# Patient Record
Sex: FEMALE | Race: WHITE | NOT HISPANIC OR LATINO | Employment: FULL TIME | URBAN - METROPOLITAN AREA
[De-identification: names, ages, dates, MRNs, and addresses within clinical notes are randomized per-mention and may not be internally consistent; named-entity substitution may affect disease eponyms.]

---

## 2017-01-05 ENCOUNTER — LAB REQUISITION (OUTPATIENT)
Dept: LAB | Facility: HOSPITAL | Age: 47
End: 2017-01-05
Payer: COMMERCIAL

## 2017-01-05 DIAGNOSIS — D50.8 OTHER IRON DEFICIENCY ANEMIAS: ICD-10-CM

## 2017-01-05 DIAGNOSIS — E55.9 VITAMIN D DEFICIENCY: ICD-10-CM

## 2017-01-05 DIAGNOSIS — Z17.1 ESTROGEN RECEPTOR NEGATIVE TUMOR STATUS: ICD-10-CM

## 2017-01-05 DIAGNOSIS — C50.511 MALIGNANT NEOPLASM OF LOWER-OUTER QUADRANT OF RIGHT FEMALE BREAST (HCC): ICD-10-CM

## 2017-01-05 LAB
25(OH)D3 SERPL-MCNC: 23.6 NG/ML (ref 30–100)
HCG SERPL QL: NEGATIVE

## 2017-01-05 PROCEDURE — 84703 CHORIONIC GONADOTROPIN ASSAY: CPT | Performed by: NURSE PRACTITIONER

## 2017-01-05 PROCEDURE — 82306 VITAMIN D 25 HYDROXY: CPT | Performed by: NURSE PRACTITIONER

## 2017-01-25 ENCOUNTER — LAB REQUISITION (OUTPATIENT)
Dept: LAB | Facility: HOSPITAL | Age: 47
End: 2017-01-25
Payer: COMMERCIAL

## 2017-01-25 DIAGNOSIS — C50.511 MALIGNANT NEOPLASM OF LOWER-OUTER QUADRANT OF RIGHT FEMALE BREAST (HCC): ICD-10-CM

## 2017-01-25 LAB — CANCER AG125 SERPL-ACNC: 11.7 U/ML (ref 0–30)

## 2017-01-25 PROCEDURE — 86304 IMMUNOASSAY TUMOR CA 125: CPT | Performed by: NURSE PRACTITIONER

## 2017-02-02 ENCOUNTER — LAB REQUISITION (OUTPATIENT)
Dept: LAB | Facility: HOSPITAL | Age: 47
End: 2017-02-02
Payer: COMMERCIAL

## 2017-02-02 DIAGNOSIS — D50.8 OTHER IRON DEFICIENCY ANEMIAS: ICD-10-CM

## 2017-02-02 DIAGNOSIS — C50.511 MALIGNANT NEOPLASM OF LOWER-OUTER QUADRANT OF RIGHT FEMALE BREAST (HCC): ICD-10-CM

## 2017-02-02 DIAGNOSIS — M79.10 MYALGIA: ICD-10-CM

## 2017-02-02 DIAGNOSIS — E06.3 AUTOIMMUNE THYROIDITIS: ICD-10-CM

## 2017-02-02 LAB — HCG SERPL QL: NEGATIVE

## 2017-02-02 PROCEDURE — 84703 CHORIONIC GONADOTROPIN ASSAY: CPT | Performed by: INTERNAL MEDICINE

## 2017-02-22 ENCOUNTER — LAB REQUISITION (OUTPATIENT)
Dept: LAB | Facility: HOSPITAL | Age: 47
End: 2017-02-22
Payer: COMMERCIAL

## 2017-02-22 DIAGNOSIS — C50.511 MALIGNANT NEOPLASM OF LOWER-OUTER QUADRANT OF RIGHT FEMALE BREAST (HCC): ICD-10-CM

## 2017-02-22 DIAGNOSIS — M79.10 MYALGIA: ICD-10-CM

## 2017-02-22 DIAGNOSIS — E06.3 AUTOIMMUNE THYROIDITIS: ICD-10-CM

## 2017-02-22 DIAGNOSIS — Z17.1 ESTROGEN RECEPTOR NEGATIVE TUMOR STATUS: ICD-10-CM

## 2017-02-22 DIAGNOSIS — D50.8 OTHER IRON DEFICIENCY ANEMIAS: ICD-10-CM

## 2017-02-22 LAB — CANCER AG125 SERPL-ACNC: 10.4 U/ML (ref 0–30)

## 2017-02-22 PROCEDURE — 86304 IMMUNOASSAY TUMOR CA 125: CPT | Performed by: NURSE PRACTITIONER

## 2017-03-22 ENCOUNTER — LAB REQUISITION (OUTPATIENT)
Dept: LAB | Facility: HOSPITAL | Age: 47
End: 2017-03-22
Payer: COMMERCIAL

## 2017-03-22 DIAGNOSIS — Z17.0 ESTROGEN RECEPTOR POSITIVE TUMOR STATUS: ICD-10-CM

## 2017-03-22 DIAGNOSIS — D50.8 OTHER IRON DEFICIENCY ANEMIAS: ICD-10-CM

## 2017-03-22 DIAGNOSIS — C50.511 MALIGNANT NEOPLASM OF LOWER-OUTER QUADRANT OF RIGHT FEMALE BREAST (HCC): ICD-10-CM

## 2017-03-22 LAB — CANCER AG125 SERPL-ACNC: 7.4 U/ML (ref 0–30)

## 2017-03-22 PROCEDURE — 86304 IMMUNOASSAY TUMOR CA 125: CPT | Performed by: NURSE PRACTITIONER

## 2017-03-29 ENCOUNTER — GENERIC CONVERSION - ENCOUNTER (OUTPATIENT)
Dept: OTHER | Facility: OTHER | Age: 47
End: 2017-03-29

## 2017-03-31 ENCOUNTER — TRANSCRIBE ORDERS (OUTPATIENT)
Dept: ADMINISTRATIVE | Facility: HOSPITAL | Age: 47
End: 2017-03-31

## 2017-03-31 ENCOUNTER — ALLSCRIPTS OFFICE VISIT (OUTPATIENT)
Dept: OTHER | Facility: OTHER | Age: 47
End: 2017-03-31

## 2017-03-31 DIAGNOSIS — C50.511 MALIGNANT NEOPLASM OF LOWER-OUTER QUADRANT OF RIGHT FEMALE BREAST (HCC): Primary | ICD-10-CM

## 2017-04-03 ENCOUNTER — HOSPITAL ENCOUNTER (OUTPATIENT)
Dept: RADIOLOGY | Age: 47
Discharge: HOME | End: 2017-04-03
Payer: COMMERCIAL

## 2017-04-03 DIAGNOSIS — C50.511: ICD-10-CM

## 2017-04-03 LAB — GLUCOSE SERPL-MCNC: 107 MG/DL (ref 65–140)

## 2017-04-03 PROCEDURE — A9552 F18 FDG: HCPCS

## 2017-04-03 PROCEDURE — 78815 PET IMAGE W/CT SKULL-THIGH: CPT

## 2017-04-03 PROCEDURE — 82948 REAGENT STRIP/BLOOD GLUCOSE: CPT

## 2017-04-22 ENCOUNTER — HOSPITAL ENCOUNTER (OUTPATIENT)
Dept: RADIOLOGY | Facility: HOSPITAL | Age: 47
Discharge: HOME | End: 2017-04-22
Attending: SURGERY
Payer: COMMERCIAL

## 2017-04-22 DIAGNOSIS — C50.511 MALIGNANT NEOPLASM OF LOWER-OUTER QUADRANT OF RIGHT FEMALE BREAST (HCC): ICD-10-CM

## 2017-04-22 PROCEDURE — 77059 HB MRI W/O FOL W/CONT, BREAST,: CPT

## 2017-04-22 PROCEDURE — C8908 MRI W/O FOL W/CONT, BREAST,: HCPCS

## 2017-04-24 ENCOUNTER — ANESTHESIA EVENT (OUTPATIENT)
Dept: GASTROENTEROLOGY | Facility: HOSPITAL | Age: 47
End: 2017-04-24
Payer: COMMERCIAL

## 2017-04-25 ENCOUNTER — HOSPITAL ENCOUNTER (OUTPATIENT)
Facility: HOSPITAL | Age: 47
Setting detail: OUTPATIENT SURGERY
Discharge: HOME | End: 2017-04-25
Attending: SURGERY | Admitting: SURGERY
Payer: COMMERCIAL

## 2017-04-25 ENCOUNTER — ANESTHESIA (OUTPATIENT)
Dept: GASTROENTEROLOGY | Facility: HOSPITAL | Age: 47
End: 2017-04-25
Payer: COMMERCIAL

## 2017-04-25 ENCOUNTER — GENERIC CONVERSION - ENCOUNTER (OUTPATIENT)
Dept: PERIOP | Facility: HOSPITAL | Age: 47
End: 2017-04-25

## 2017-04-25 ENCOUNTER — GENERIC CONVERSION - ENCOUNTER (OUTPATIENT)
Dept: OTHER | Facility: OTHER | Age: 47
End: 2017-04-25

## 2017-04-25 VITALS
TEMPERATURE: 98.1 F | SYSTOLIC BLOOD PRESSURE: 120 MMHG | DIASTOLIC BLOOD PRESSURE: 66 MMHG | RESPIRATION RATE: 14 BRPM | HEIGHT: 66 IN | OXYGEN SATURATION: 100 % | HEART RATE: 80 BPM | WEIGHT: 175 LBS | BODY MASS INDEX: 28.12 KG/M2

## 2017-04-25 DIAGNOSIS — Z12.11 ENCOUNTER FOR SCREENING FOR MALIGNANT NEOPLASM OF COLON: ICD-10-CM

## 2017-04-25 DIAGNOSIS — K21.9 GASTRO-ESOPHAGEAL REFLUX DISEASE WITHOUT ESOPHAGITIS: ICD-10-CM

## 2017-04-25 LAB — EXT PREGNANCY TEST URINE: NEGATIVE

## 2017-04-25 PROCEDURE — 88305 TISSUE EXAM BY PATHOLOGIST: CPT | Performed by: SURGERY

## 2017-04-25 PROCEDURE — 81025 URINE PREGNANCY TEST: CPT | Performed by: ANESTHESIOLOGY

## 2017-04-25 RX ORDER — PROPOFOL 10 MG/ML
INJECTION, EMULSION INTRAVENOUS CONTINUOUS PRN
Status: DISCONTINUED | OUTPATIENT
Start: 2017-04-25 | End: 2017-04-25 | Stop reason: SURG

## 2017-04-25 RX ORDER — PROPOFOL 10 MG/ML
INJECTION, EMULSION INTRAVENOUS AS NEEDED
Status: DISCONTINUED | OUTPATIENT
Start: 2017-04-25 | End: 2017-04-25 | Stop reason: SURG

## 2017-04-25 RX ORDER — METOPROLOL SUCCINATE 25 MG/1
25 TABLET, EXTENDED RELEASE ORAL EVERY EVENING
COMMUNITY

## 2017-04-25 RX ORDER — SODIUM CHLORIDE 9 MG/ML
125 INJECTION, SOLUTION INTRAVENOUS CONTINUOUS
Status: DISCONTINUED | OUTPATIENT
Start: 2017-04-25 | End: 2017-04-25 | Stop reason: HOSPADM

## 2017-04-25 RX ADMIN — PROPOFOL 50 MG: 10 INJECTION, EMULSION INTRAVENOUS at 10:05

## 2017-04-25 RX ADMIN — SODIUM CHLORIDE: 0.9 INJECTION, SOLUTION INTRAVENOUS at 09:53

## 2017-04-25 RX ADMIN — PROPOFOL 50 MG: 10 INJECTION, EMULSION INTRAVENOUS at 10:12

## 2017-04-25 RX ADMIN — PROPOFOL 120 MCG/KG/MIN: 10 INJECTION, EMULSION INTRAVENOUS at 09:57

## 2017-04-25 RX ADMIN — PROPOFOL 120 MG: 10 INJECTION, EMULSION INTRAVENOUS at 09:57

## 2017-04-25 RX ADMIN — SODIUM CHLORIDE 125 ML/HR: 0.9 INJECTION, SOLUTION INTRAVENOUS at 09:23

## 2017-04-27 ENCOUNTER — ALLSCRIPTS OFFICE VISIT (OUTPATIENT)
Dept: OTHER | Facility: OTHER | Age: 47
End: 2017-04-27

## 2017-04-28 ENCOUNTER — TRANSCRIBE ORDERS (OUTPATIENT)
Dept: ADMINISTRATIVE | Facility: HOSPITAL | Age: 47
End: 2017-04-28

## 2017-04-28 ENCOUNTER — LAB REQUISITION (OUTPATIENT)
Dept: LAB | Facility: HOSPITAL | Age: 47
End: 2017-04-28
Payer: COMMERCIAL

## 2017-04-28 DIAGNOSIS — C50.511 MALIGNANT NEOPLASM OF LOWER-OUTER QUADRANT OF RIGHT FEMALE BREAST (HCC): ICD-10-CM

## 2017-04-28 DIAGNOSIS — E55.9 VITAMIN D DEFICIENCY: ICD-10-CM

## 2017-04-28 DIAGNOSIS — C50.511 MALIGNANT NEOPLASM OF LOWER-OUTER QUADRANT OF RIGHT FEMALE BREAST (HCC): Primary | ICD-10-CM

## 2017-04-28 DIAGNOSIS — Z17.1 ESTROGEN RECEPTOR NEGATIVE TUMOR STATUS: ICD-10-CM

## 2017-04-28 LAB
25(OH)D3 SERPL-MCNC: 32.9 NG/ML (ref 30–100)
CANCER AG125 SERPL-ACNC: 7.2 U/ML (ref 0–30)

## 2017-04-28 PROCEDURE — 86304 IMMUNOASSAY TUMOR CA 125: CPT | Performed by: NURSE PRACTITIONER

## 2017-04-28 PROCEDURE — 82306 VITAMIN D 25 HYDROXY: CPT | Performed by: NURSE PRACTITIONER

## 2017-05-01 ENCOUNTER — TRANSCRIBE ORDERS (OUTPATIENT)
Dept: ADMINISTRATIVE | Facility: HOSPITAL | Age: 47
End: 2017-05-01

## 2017-05-01 DIAGNOSIS — Z92.21 S/P CHEMOTHERAPY, TIME SINCE 4-12 WEEKS: Primary | ICD-10-CM

## 2017-05-04 ENCOUNTER — GENERIC CONVERSION - ENCOUNTER (OUTPATIENT)
Dept: OTHER | Facility: OTHER | Age: 47
End: 2017-05-04

## 2017-05-16 ENCOUNTER — HOSPITAL ENCOUNTER (OUTPATIENT)
Dept: NON INVASIVE DIAGNOSTICS | Facility: HOSPITAL | Age: 47
Discharge: HOME | End: 2017-05-16
Attending: INTERNAL MEDICINE
Payer: COMMERCIAL

## 2017-05-16 ENCOUNTER — ANESTHESIA EVENT (OUTPATIENT)
Dept: PERIOP | Facility: HOSPITAL | Age: 47
End: 2017-05-16
Payer: COMMERCIAL

## 2017-05-16 DIAGNOSIS — Z92.21 S/P CHEMOTHERAPY, TIME SINCE 4-12 WEEKS: ICD-10-CM

## 2017-05-16 PROCEDURE — 93306 TTE W/DOPPLER COMPLETE: CPT

## 2017-05-24 ENCOUNTER — HOSPITAL ENCOUNTER (OUTPATIENT)
Dept: MAMMOGRAPHY | Facility: HOSPITAL | Age: 47
Discharge: HOME | End: 2017-05-24
Attending: SURGERY
Payer: COMMERCIAL

## 2017-05-24 ENCOUNTER — HOSPITAL ENCOUNTER (OUTPATIENT)
Dept: NUCLEAR MEDICINE | Facility: HOSPITAL | Age: 47
Discharge: HOME | End: 2017-05-24
Attending: SURGERY
Payer: COMMERCIAL

## 2017-05-24 ENCOUNTER — CONVERSION ENCOUNTER (OUTPATIENT)
Dept: MAMMOGRAPHY | Facility: HOSPITAL | Age: 47
End: 2017-05-24

## 2017-05-24 ENCOUNTER — HOSPITAL ENCOUNTER (OUTPATIENT)
Facility: HOSPITAL | Age: 47
Setting detail: OUTPATIENT SURGERY
Discharge: HOME | End: 2017-05-25
Attending: SURGERY | Admitting: SURGERY
Payer: COMMERCIAL

## 2017-05-24 ENCOUNTER — HOSPITAL ENCOUNTER (OUTPATIENT)
Dept: MAMMOGRAPHY | Facility: HOSPITAL | Age: 47
Setting detail: OUTPATIENT SURGERY
Discharge: HOME | End: 2017-05-24
Payer: COMMERCIAL

## 2017-05-24 ENCOUNTER — ANESTHESIA (OUTPATIENT)
Dept: PERIOP | Facility: HOSPITAL | Age: 47
End: 2017-05-24
Payer: COMMERCIAL

## 2017-05-24 DIAGNOSIS — C50.511 MALIGNANT NEOPLASM OF LOWER-OUTER QUADRANT OF RIGHT FEMALE BREAST (HCC): ICD-10-CM

## 2017-05-24 DIAGNOSIS — C50.911 MALIGNANT NEOPLASM OF RIGHT FEMALE BREAST (HCC): ICD-10-CM

## 2017-05-24 LAB — EXT PREGNANCY TEST URINE: NEGATIVE

## 2017-05-24 PROCEDURE — A9270 NON-COVERED ITEM OR SERVICE: HCPCS | Performed by: PHYSICIAN ASSISTANT

## 2017-05-24 PROCEDURE — 88307 TISSUE EXAM BY PATHOLOGIST: CPT | Performed by: SURGERY

## 2017-05-24 PROCEDURE — 81025 URINE PREGNANCY TEST: CPT | Performed by: SURGERY

## 2017-05-24 PROCEDURE — 88342 IMHCHEM/IMCYTCHM 1ST ANTB: CPT | Performed by: SURGERY

## 2017-05-24 PROCEDURE — 88341 IMHCHEM/IMCYTCHM EA ADD ANTB: CPT | Performed by: SURGERY

## 2017-05-24 PROCEDURE — 88361 TUMOR IMMUNOHISTOCHEM/COMPUT: CPT | Performed by: SURGERY

## 2017-05-24 PROCEDURE — A9541 TC99M SULFUR COLLOID: HCPCS

## 2017-05-24 PROCEDURE — 19281 PERQ DEVICE BREAST 1ST IMAG: CPT

## 2017-05-24 PROCEDURE — 19282 PERQ DEVICE BREAST EA IMAG: CPT

## 2017-05-24 PROCEDURE — 38792 RA TRACER ID OF SENTINL NODE: CPT

## 2017-05-24 RX ORDER — ONDANSETRON 2 MG/ML
4 INJECTION INTRAMUSCULAR; INTRAVENOUS EVERY 4 HOURS PRN
Status: DISCONTINUED | OUTPATIENT
Start: 2017-05-24 | End: 2017-05-25 | Stop reason: HOSPADM

## 2017-05-24 RX ORDER — SODIUM CHLORIDE, SODIUM LACTATE, POTASSIUM CHLORIDE, CALCIUM CHLORIDE 600; 310; 30; 20 MG/100ML; MG/100ML; MG/100ML; MG/100ML
75 INJECTION, SOLUTION INTRAVENOUS CONTINUOUS
Status: DISCONTINUED | OUTPATIENT
Start: 2017-05-24 | End: 2017-05-25 | Stop reason: HOSPADM

## 2017-05-24 RX ORDER — ONDANSETRON 2 MG/ML
INJECTION INTRAMUSCULAR; INTRAVENOUS AS NEEDED
Status: DISCONTINUED | OUTPATIENT
Start: 2017-05-24 | End: 2017-05-24 | Stop reason: SURG

## 2017-05-24 RX ORDER — METHYLENE BLUE 10 MG/ML
INJECTION INTRAVENOUS AS NEEDED
Status: DISCONTINUED | OUTPATIENT
Start: 2017-05-24 | End: 2017-05-24 | Stop reason: HOSPADM

## 2017-05-24 RX ORDER — LIDOCAINE WITH 8.4% SOD BICARB 0.9%(10ML)
10 SYRINGE (ML) INJECTION ONCE
Status: DISCONTINUED | OUTPATIENT
Start: 2017-05-24 | End: 2017-05-25 | Stop reason: HOSPADM

## 2017-05-24 RX ORDER — HEPARIN SODIUM 5000 [USP'U]/ML
5000 INJECTION, SOLUTION INTRAVENOUS; SUBCUTANEOUS EVERY 8 HOURS SCHEDULED
Status: DISCONTINUED | OUTPATIENT
Start: 2017-05-24 | End: 2017-05-25 | Stop reason: HOSPADM

## 2017-05-24 RX ORDER — MIDAZOLAM HYDROCHLORIDE 1 MG/ML
INJECTION INTRAMUSCULAR; INTRAVENOUS AS NEEDED
Status: DISCONTINUED | OUTPATIENT
Start: 2017-05-24 | End: 2017-05-24 | Stop reason: SURG

## 2017-05-24 RX ORDER — LIDOCAINE HYDROCHLORIDE 10 MG/ML
INJECTION, SOLUTION INFILTRATION; PERINEURAL AS NEEDED
Status: DISCONTINUED | OUTPATIENT
Start: 2017-05-24 | End: 2017-05-24 | Stop reason: SURG

## 2017-05-24 RX ORDER — SODIUM CHLORIDE, SODIUM LACTATE, POTASSIUM CHLORIDE, CALCIUM CHLORIDE 600; 310; 30; 20 MG/100ML; MG/100ML; MG/100ML; MG/100ML
80 INJECTION, SOLUTION INTRAVENOUS CONTINUOUS
Status: DISCONTINUED | OUTPATIENT
Start: 2017-05-24 | End: 2017-05-25 | Stop reason: SDUPTHER

## 2017-05-24 RX ORDER — PROPOFOL 10 MG/ML
INJECTION, EMULSION INTRAVENOUS AS NEEDED
Status: DISCONTINUED | OUTPATIENT
Start: 2017-05-24 | End: 2017-05-24 | Stop reason: SURG

## 2017-05-24 RX ORDER — HYDROCODONE BITARTRATE AND ACETAMINOPHEN 5; 325 MG/1; MG/1
1 TABLET ORAL EVERY 4 HOURS PRN
Qty: 30 TABLET | Refills: 0 | Status: ON HOLD | OUTPATIENT
Start: 2017-05-24 | End: 2018-03-01 | Stop reason: ALTCHOICE

## 2017-05-24 RX ORDER — GLYCOPYRROLATE 0.2 MG/ML
INJECTION INTRAMUSCULAR; INTRAVENOUS AS NEEDED
Status: DISCONTINUED | OUTPATIENT
Start: 2017-05-24 | End: 2017-05-24 | Stop reason: SURG

## 2017-05-24 RX ORDER — HYDROCODONE BITARTRATE AND ACETAMINOPHEN 5; 325 MG/1; MG/1
1 TABLET ORAL EVERY 4 HOURS PRN
Status: DISCONTINUED | OUTPATIENT
Start: 2017-05-24 | End: 2017-05-25 | Stop reason: HOSPADM

## 2017-05-24 RX ORDER — FENTANYL CITRATE/PF 50 MCG/ML
50 SYRINGE (ML) INJECTION
Status: DISCONTINUED | OUTPATIENT
Start: 2017-05-24 | End: 2017-05-24 | Stop reason: HOSPADM

## 2017-05-24 RX ORDER — CLINDAMYCIN PHOSPHATE 900 MG/50ML
900 INJECTION INTRAVENOUS
Status: DISCONTINUED | OUTPATIENT
Start: 2017-05-24 | End: 2017-05-24 | Stop reason: HOSPADM

## 2017-05-24 RX ORDER — 0.9 % SODIUM CHLORIDE 0.9 %
VIAL (ML) INJECTION AS NEEDED
Status: DISCONTINUED | OUTPATIENT
Start: 2017-05-24 | End: 2017-05-24 | Stop reason: HOSPADM

## 2017-05-24 RX ORDER — ONDANSETRON 2 MG/ML
4 INJECTION INTRAMUSCULAR; INTRAVENOUS ONCE
Status: DISCONTINUED | OUTPATIENT
Start: 2017-05-24 | End: 2017-05-24 | Stop reason: HOSPADM

## 2017-05-24 RX ORDER — ACETAMINOPHEN 325 MG/1
650 TABLET ORAL EVERY 6 HOURS PRN
Status: DISCONTINUED | OUTPATIENT
Start: 2017-05-24 | End: 2017-05-25 | Stop reason: HOSPADM

## 2017-05-24 RX ORDER — ROCURONIUM BROMIDE 10 MG/ML
INJECTION, SOLUTION INTRAVENOUS AS NEEDED
Status: DISCONTINUED | OUTPATIENT
Start: 2017-05-24 | End: 2017-05-24 | Stop reason: SURG

## 2017-05-24 RX ORDER — SODIUM CHLORIDE 9 MG/ML
125 INJECTION, SOLUTION INTRAVENOUS CONTINUOUS
Status: DISCONTINUED | OUTPATIENT
Start: 2017-05-24 | End: 2017-05-25 | Stop reason: HOSPADM

## 2017-05-24 RX ORDER — SUCCINYLCHOLINE CHLORIDE 20 MG/ML
INJECTION INTRAMUSCULAR; INTRAVENOUS AS NEEDED
Status: DISCONTINUED | OUTPATIENT
Start: 2017-05-24 | End: 2017-05-24 | Stop reason: SURG

## 2017-05-24 RX ORDER — MORPHINE SULFATE 2 MG/ML
2 INJECTION, SOLUTION INTRAMUSCULAR; INTRAVENOUS EVERY 2 HOUR PRN
Status: DISCONTINUED | OUTPATIENT
Start: 2017-05-24 | End: 2017-05-25 | Stop reason: HOSPADM

## 2017-05-24 RX ORDER — FENTANYL CITRATE 50 UG/ML
INJECTION, SOLUTION INTRAMUSCULAR; INTRAVENOUS AS NEEDED
Status: DISCONTINUED | OUTPATIENT
Start: 2017-05-24 | End: 2017-05-24 | Stop reason: SURG

## 2017-05-24 RX ADMIN — PROPOFOL 100 MG: 10 INJECTION, EMULSION INTRAVENOUS at 13:10

## 2017-05-24 RX ADMIN — ONDANSETRON HYDROCHLORIDE 4 MG: 2 INJECTION, SOLUTION INTRAVENOUS at 13:59

## 2017-05-24 RX ADMIN — SUCCINYLCHOLINE CHLORIDE 100 MG: 20 INJECTION, SOLUTION INTRAMUSCULAR; INTRAVENOUS at 13:05

## 2017-05-24 RX ADMIN — PROPOFOL 200 MG: 10 INJECTION, EMULSION INTRAVENOUS at 13:05

## 2017-05-24 RX ADMIN — SODIUM CHLORIDE 125 ML/HR: 0.9 INJECTION, SOLUTION INTRAVENOUS at 09:30

## 2017-05-24 RX ADMIN — FENTANYL CITRATE 50 MCG: 50 INJECTION, SOLUTION INTRAMUSCULAR; INTRAVENOUS at 13:44

## 2017-05-24 RX ADMIN — DEXAMETHASONE SODIUM PHOSPHATE 10 MG: 10 INJECTION INTRAMUSCULAR; INTRAVENOUS at 13:59

## 2017-05-24 RX ADMIN — HYDROCODONE BITARTRATE AND ACETAMINOPHEN 1 TABLET: 5; 325 TABLET ORAL at 16:33

## 2017-05-24 RX ADMIN — NEOSTIGMINE METHYLSULFATE 4 MG: 1 INJECTION, SOLUTION INTRAMUSCULAR; INTRAVENOUS; SUBCUTANEOUS at 14:44

## 2017-05-24 RX ADMIN — HYDROCODONE BITARTRATE AND ACETAMINOPHEN 1 TABLET: 5; 325 TABLET ORAL at 21:04

## 2017-05-24 RX ADMIN — FENTANYL CITRATE 50 MCG: 50 INJECTION, SOLUTION INTRAMUSCULAR; INTRAVENOUS at 13:50

## 2017-05-24 RX ADMIN — GLYCOPYRROLATE 0.8 MG: 0.2 INJECTION, SOLUTION INTRAMUSCULAR; INTRAVENOUS at 14:44

## 2017-05-24 RX ADMIN — MIDAZOLAM HYDROCHLORIDE 2 MG: 1 INJECTION, SOLUTION INTRAMUSCULAR; INTRAVENOUS at 13:02

## 2017-05-24 RX ADMIN — FENTANYL CITRATE 100 MCG: 50 INJECTION, SOLUTION INTRAMUSCULAR; INTRAVENOUS at 13:05

## 2017-05-24 RX ADMIN — ROCURONIUM BROMIDE 50 MG: 10 INJECTION, SOLUTION INTRAVENOUS at 13:11

## 2017-05-24 RX ADMIN — LIDOCAINE HYDROCHLORIDE 100 MG: 10 INJECTION, SOLUTION INFILTRATION; PERINEURAL at 13:05

## 2017-05-24 RX ADMIN — SODIUM CHLORIDE: 0.9 INJECTION, SOLUTION INTRAVENOUS at 15:05

## 2017-05-24 RX ADMIN — CLINDAMYCIN PHOSPHATE 900 MG: 18 INJECTION, SOLUTION INTRAMUSCULAR; INTRAVENOUS at 13:26

## 2017-05-25 VITALS
TEMPERATURE: 97.8 F | OXYGEN SATURATION: 97 % | HEIGHT: 66 IN | WEIGHT: 181.6 LBS | DIASTOLIC BLOOD PRESSURE: 69 MMHG | RESPIRATION RATE: 20 BRPM | BODY MASS INDEX: 29.18 KG/M2 | HEART RATE: 111 BPM | SYSTOLIC BLOOD PRESSURE: 124 MMHG

## 2017-05-25 PROBLEM — C50.911 MALIGNANT NEOPLASM OF RIGHT FEMALE BREAST (HCC): Status: ACTIVE | Noted: 2017-05-25

## 2017-05-25 LAB
PLATELET # BLD AUTO: 270 THOUSANDS/UL (ref 149–390)
PMV BLD AUTO: 9.8 FL (ref 8.9–12.7)

## 2017-05-25 PROCEDURE — A9270 NON-COVERED ITEM OR SERVICE: HCPCS | Performed by: PHYSICIAN ASSISTANT

## 2017-05-25 PROCEDURE — 85049 AUTOMATED PLATELET COUNT: CPT | Performed by: SURGERY

## 2017-05-25 RX ADMIN — HYDROCODONE BITARTRATE AND ACETAMINOPHEN 1 TABLET: 5; 325 TABLET ORAL at 05:12

## 2017-05-25 RX ADMIN — Medication 50 UNITS: at 10:02

## 2017-06-02 ENCOUNTER — GENERIC CONVERSION - ENCOUNTER (OUTPATIENT)
Dept: OTHER | Facility: OTHER | Age: 47
End: 2017-06-02

## 2017-06-05 ENCOUNTER — GENERIC CONVERSION - ENCOUNTER (OUTPATIENT)
Dept: OTHER | Facility: OTHER | Age: 47
End: 2017-06-05

## 2017-06-05 ENCOUNTER — ALLSCRIPTS OFFICE VISIT (OUTPATIENT)
Dept: OTHER | Facility: OTHER | Age: 47
End: 2017-06-05

## 2017-06-13 ENCOUNTER — GENERIC CONVERSION - ENCOUNTER (OUTPATIENT)
Dept: OTHER | Facility: OTHER | Age: 47
End: 2017-06-13

## 2017-06-16 ENCOUNTER — GENERIC CONVERSION - ENCOUNTER (OUTPATIENT)
Dept: OTHER | Facility: OTHER | Age: 47
End: 2017-06-16

## 2017-06-21 ENCOUNTER — LAB REQUISITION (OUTPATIENT)
Dept: LAB | Facility: HOSPITAL | Age: 47
End: 2017-06-21
Payer: COMMERCIAL

## 2017-06-21 ENCOUNTER — HOSPITAL ENCOUNTER (OUTPATIENT)
Dept: RADIOLOGY | Facility: HOSPITAL | Age: 47
Discharge: HOME | End: 2017-06-21
Attending: INTERNAL MEDICINE | Admitting: INTERNAL MEDICINE
Payer: COMMERCIAL

## 2017-06-21 ENCOUNTER — TRANSCRIBE ORDERS (OUTPATIENT)
Dept: ADMINISTRATIVE | Facility: HOSPITAL | Age: 47
End: 2017-06-21

## 2017-06-21 DIAGNOSIS — Z87.442 PERSONAL HISTORY OF URINARY CALCULI: ICD-10-CM

## 2017-06-21 DIAGNOSIS — C50.511 MALIGNANT NEOPLASM OF LOWER-OUTER QUADRANT OF RIGHT FEMALE BREAST (HCC): ICD-10-CM

## 2017-06-21 DIAGNOSIS — Z17.1 ESTROGEN RECEPTOR NEGATIVE TUMOR STATUS: ICD-10-CM

## 2017-06-21 DIAGNOSIS — D50.8 OTHER IRON DEFICIENCY ANEMIAS: ICD-10-CM

## 2017-06-21 DIAGNOSIS — R10.9 ABDOMINAL PAIN, UNSPECIFIED SITE: ICD-10-CM

## 2017-06-21 DIAGNOSIS — R10.9 ABDOMINAL PAIN, UNSPECIFIED SITE: Primary | ICD-10-CM

## 2017-06-21 LAB
BACTERIA UR QL AUTO: NORMAL /HPF
BILIRUB UR QL STRIP: NEGATIVE
CLARITY UR: CLEAR
COLOR UR: YELLOW
GLUCOSE UR STRIP-MCNC: NEGATIVE MG/DL
HCG SERPL QL: NEGATIVE
HGB UR QL STRIP.AUTO: ABNORMAL
HYALINE CASTS #/AREA URNS LPF: NORMAL /LPF
KETONES UR STRIP-MCNC: NEGATIVE MG/DL
LEUKOCYTE ESTERASE UR QL STRIP: NEGATIVE
NITRITE UR QL STRIP: NEGATIVE
NON-SQ EPI CELLS URNS QL MICRO: NORMAL /HPF
PH UR STRIP.AUTO: 5.5 [PH] (ref 4.5–8)
PROT UR STRIP-MCNC: NEGATIVE MG/DL
RBC #/AREA URNS AUTO: NORMAL /HPF
SCAN RESULT: NORMAL
SP GR UR STRIP.AUTO: 1.01 (ref 1–1.03)
UROBILINOGEN UR QL STRIP.AUTO: 0.2 E.U./DL
WBC #/AREA URNS AUTO: NORMAL /HPF

## 2017-06-21 PROCEDURE — 87086 URINE CULTURE/COLONY COUNT: CPT | Performed by: NURSE PRACTITIONER

## 2017-06-21 PROCEDURE — 84703 CHORIONIC GONADOTROPIN ASSAY: CPT | Performed by: NURSE PRACTITIONER

## 2017-06-21 PROCEDURE — 81001 URINALYSIS AUTO W/SCOPE: CPT | Performed by: NURSE PRACTITIONER

## 2017-06-21 PROCEDURE — 76770 US EXAM ABDO BACK WALL COMP: CPT

## 2017-06-23 ENCOUNTER — APPOINTMENT (OUTPATIENT)
Dept: LAB | Facility: HOSPITAL | Age: 47
End: 2017-06-23
Payer: COMMERCIAL

## 2017-06-23 ENCOUNTER — TRANSCRIBE ORDERS (OUTPATIENT)
Dept: LAB | Facility: HOSPITAL | Age: 47
End: 2017-06-23

## 2017-06-23 DIAGNOSIS — C50.919 MALIGNANT NEOPLASM OF FEMALE BREAST, UNSPECIFIED LATERALITY, UNSPECIFIED SITE OF BREAST: ICD-10-CM

## 2017-06-23 DIAGNOSIS — C50.919 MALIGNANT NEOPLASM OF FEMALE BREAST, UNSPECIFIED LATERALITY, UNSPECIFIED SITE OF BREAST: Primary | ICD-10-CM

## 2017-06-23 LAB
BACTERIA UR CULT: NORMAL
CREAT 24H UR-MRATE: 1.1 G/24HR (ref 0.6–1.8)
SPECIMEN VOL UR: 2800 ML

## 2017-06-23 PROCEDURE — 82570 ASSAY OF URINE CREATININE: CPT

## 2017-07-10 ENCOUNTER — LAB REQUISITION (OUTPATIENT)
Dept: LAB | Facility: HOSPITAL | Age: 47
End: 2017-07-10
Payer: COMMERCIAL

## 2017-07-10 DIAGNOSIS — C50.511 MALIGNANT NEOPLASM OF LOWER-OUTER QUADRANT OF RIGHT FEMALE BREAST (HCC): ICD-10-CM

## 2017-07-10 LAB — CANCER AG125 SERPL-ACNC: 8 U/ML (ref 0–30)

## 2017-07-10 PROCEDURE — 86304 IMMUNOASSAY TUMOR CA 125: CPT | Performed by: NURSE PRACTITIONER

## 2017-08-01 ENCOUNTER — LAB REQUISITION (OUTPATIENT)
Dept: LAB | Facility: HOSPITAL | Age: 47
End: 2017-08-01
Payer: COMMERCIAL

## 2017-08-01 DIAGNOSIS — Z17.1 ESTROGEN RECEPTOR NEGATIVE TUMOR STATUS: ICD-10-CM

## 2017-08-01 DIAGNOSIS — C50.511 MALIGNANT NEOPLASM OF LOWER-OUTER QUADRANT OF RIGHT FEMALE BREAST (HCC): ICD-10-CM

## 2017-08-01 LAB — CANCER AG125 SERPL-ACNC: 6.3 U/ML (ref 0–30)

## 2017-08-01 PROCEDURE — 86304 IMMUNOASSAY TUMOR CA 125: CPT | Performed by: INTERNAL MEDICINE

## 2017-08-22 ENCOUNTER — LAB REQUISITION (OUTPATIENT)
Dept: LAB | Facility: HOSPITAL | Age: 47
End: 2017-08-22
Payer: COMMERCIAL

## 2017-08-22 DIAGNOSIS — C50.511 MALIGNANT NEOPLASM OF LOWER-OUTER QUADRANT OF RIGHT FEMALE BREAST (HCC): ICD-10-CM

## 2017-08-22 DIAGNOSIS — D50.8 OTHER IRON DEFICIENCY ANEMIAS: ICD-10-CM

## 2017-08-22 LAB — CANCER AG125 SERPL-ACNC: 7.6 U/ML (ref 0–30)

## 2017-08-22 PROCEDURE — 86304 IMMUNOASSAY TUMOR CA 125: CPT | Performed by: INTERNAL MEDICINE

## 2017-09-03 ENCOUNTER — APPOINTMENT (OUTPATIENT)
Dept: LAB | Facility: HOSPITAL | Age: 47
End: 2017-09-03
Attending: INTERNAL MEDICINE
Payer: COMMERCIAL

## 2017-09-03 ENCOUNTER — TRANSCRIBE ORDERS (OUTPATIENT)
Dept: LAB | Facility: HOSPITAL | Age: 47
End: 2017-09-03

## 2017-09-03 DIAGNOSIS — C50.919 CARCINOMA OF BREAST, UNSPECIFIED LATERALITY (HCC): Primary | ICD-10-CM

## 2017-09-03 DIAGNOSIS — C50.919 CARCINOMA OF BREAST, UNSPECIFIED LATERALITY (HCC): ICD-10-CM

## 2017-09-03 DIAGNOSIS — Z09 CHEMOTHERAPY FOLLOW-UP EXAMINATION: ICD-10-CM

## 2017-09-03 LAB
CREAT 24H UR-MRATE: 1 G/24HR (ref 0.6–1.8)
CREAT CL 24H UR+SERPL-VRATE: 772.97 ML/MIN (ref 75–115)
CREAT SERPL-MCNC: 0.71 MG/DL (ref 0.6–1.3)
CREAT UR-MCNC: 36.2 MG/DL
SPECIMEN VOL UR: 2650 ML

## 2017-09-03 PROCEDURE — 36415 COLL VENOUS BLD VENIPUNCTURE: CPT

## 2017-09-03 PROCEDURE — 82575 CREATININE CLEARANCE TEST: CPT

## 2017-09-03 PROCEDURE — 82565 ASSAY OF CREATININE: CPT

## 2017-11-27 ENCOUNTER — LAB REQUISITION (OUTPATIENT)
Dept: LAB | Facility: HOSPITAL | Age: 47
End: 2017-11-27
Payer: COMMERCIAL

## 2017-11-27 DIAGNOSIS — C50.511 MALIGNANT NEOPLASM OF LOWER-OUTER QUADRANT OF RIGHT FEMALE BREAST (HCC): ICD-10-CM

## 2017-11-27 DIAGNOSIS — D50.8 OTHER IRON DEFICIENCY ANEMIAS (CODE): ICD-10-CM

## 2017-11-27 LAB
CRP SERPL QL: <3 MG/L
ERYTHROCYTE [SEDIMENTATION RATE] IN BLOOD: 14 MM/HOUR (ref 0–20)

## 2017-11-27 PROCEDURE — 85652 RBC SED RATE AUTOMATED: CPT | Performed by: INTERNAL MEDICINE

## 2017-11-27 PROCEDURE — 86140 C-REACTIVE PROTEIN: CPT | Performed by: INTERNAL MEDICINE

## 2018-01-11 NOTE — MISCELLANEOUS
Message   Recorded as Task   Date: 05/25/2017 09:31 AM, Created By: Lianet Church   Task Name: Follow Up   Assigned To: [facility],Team   Regarding Patient: John Chester, Status: Active   Comment:    Hortencia Flynn - 25 May 2017 9:31 AM     TASK CREATED  Routine post op call placed to patient  Right lumpectomy and axillary lymph node excision done on 05/24/2017  Patient spent the night in the hospital and is getting discharge this AM   Patient had no questions or concerns  No N/V/F/C  Patient is a little sore but pain medication is helping  Verified post op appointment is 06/05/2017 at 8:45am in the [facility] office  Patient verbalized understanding  Pathology is pending  Heather Meyers - 31 May 2017 3:51 PM     TASK EDITED  Per [facility], pathology is still preliminary     Heather Meyers - 05 Jun 2017 8:16 AM     TASK EDITED  Path received  Dr Hermila Kwok spoke to patient on Friday  Patient is scheduled for POPV this morning with Dr Hermila Kwok  Active Problems    1  At risk for colon cancer (V49 89) (Z91 89)   2  Discharge from right nipple (611 79) (N64 52)   3  Diverticulitis of colon (562 11) (K57 32)   4  Left breast lump (611 72) (N63)   5  Malignant neoplasm of lower-outer quadrant of right female breast (174 5) (C50 511)    Current Meds   1  MoviPrep 100 GM Oral Solution Reconstituted; USE AS DIRECTED; Therapy: 52DGR1114 to (Last Rx:07Nov2016) Ordered   2  Prepopik 10-3 5-12 MG-GM-GM Oral Packet; DILUTE CONTENTS OF PACKET AND USE   AS DIRECTED FOR BOWEL PREP; Therapy: 82JMN9226 to (Last Rx:07Nov2016) Ordered   3  Prepopik 10-3 5-12 MG-GM-GM Oral Packet; DILUTE CONTENTS OF PACKET AND USE   AS DIRECTED FOR BOWEL PREP; Therapy: 38ZGC4590 to (Last Rx:31Mar2017) Ordered    Allergies    1  amoxicillin   2  Flagyl   3   Penicillins    Signatures   Electronically signed by : Jerson White, ; Jun 5 2017  8:16AM EST                       (Author)

## 2018-01-12 VITALS
WEIGHT: 179 LBS | SYSTOLIC BLOOD PRESSURE: 118 MMHG | DIASTOLIC BLOOD PRESSURE: 72 MMHG | TEMPERATURE: 99.3 F | RESPIRATION RATE: 16 BRPM | HEART RATE: 92 BPM | HEIGHT: 66 IN | BODY MASS INDEX: 28.77 KG/M2

## 2018-01-12 VITALS
BODY MASS INDEX: 28.34 KG/M2 | RESPIRATION RATE: 16 BRPM | HEIGHT: 66 IN | TEMPERATURE: 97.4 F | WEIGHT: 176.38 LBS | SYSTOLIC BLOOD PRESSURE: 120 MMHG | DIASTOLIC BLOOD PRESSURE: 72 MMHG | HEART RATE: 88 BPM

## 2018-01-13 NOTE — MISCELLANEOUS
Message   Date: 14 Dec 2016 9:19 AM EST, Recorded By: Sharron Wen For: [facility],Team   Pt called answering service regarding drainage coming out from around incision site  Pt spoke with Dr Fernandez and said that that is normal  Pt states the draining did stop and it was only a little  Visiting nurses will be in today to rehydrate her  Pt had no further questions or concerns  Active Problems    1  Discharge from right nipple (611 79) (N64 52)   2  Diverticulitis of colon (562 11) (K57 32)   3  Left breast lump (611 72) (N63)   4  Malignant neoplasm of lower-outer quadrant of right female breast (174 5) (C50 511)    Current Meds   1  MoviPrep 100 GM Oral Solution Reconstituted; USE AS DIRECTED; Therapy: 53CIX4607 to (Last Rx:07Nov2016) Ordered   2  Prepopik 10-3 5-12 MG-GM-GM Oral Packet; DILUTE CONTENTS OF PACKET AND USE   AS DIRECTED FOR BOWEL PREP; Therapy: 70CCW0578 to (Last Rx:07Nov2016) Ordered    Allergies    1  amoxicillin   2  Flagyl   3   Penicillins    Signatures   Electronically signed by : Capo Fagan, ; Dec 14 2016  9:21AM EST                       (Author)

## 2018-01-13 NOTE — MISCELLANEOUS
Message  Mailed colono / EGD letter to patients home address  Faxed reports, letter and pathology to Dr Torri Richards office  {Updated pre-visit planning to reflect 10 year follow up for colono  }      Active Problems    1  At risk for colon cancer (V49 89) (Z91 89)   2  Discharge from right nipple (611 79) (N64 52)   3  Diverticulitis of colon (562 11) (K57 32)   4  Left breast lump (611 72) (N63)   5  Malignant neoplasm of lower-outer quadrant of right female breast (174 5) (C50 511)    Current Meds   1  MoviPrep 100 GM Oral Solution Reconstituted; USE AS DIRECTED; Therapy: 37CBX1876 to (Last Rx:07Nov2016) Ordered   2  Prepopik 10-3 5-12 MG-GM-GM Oral Packet; DILUTE CONTENTS OF PACKET AND USE   AS DIRECTED FOR BOWEL PREP; Therapy: 12KMA8171 to (Last Rx:07Nov2016) Ordered   3  Prepopik 10-3 5-12 MG-GM-GM Oral Packet; DILUTE CONTENTS OF PACKET AND USE   AS DIRECTED FOR BOWEL PREP; Therapy: 15PYY3222 to (Last Rx:31Mar2017) Ordered    Allergies    1  amoxicillin   2  Flagyl   3  Penicillins    Plan  Diverticulitis of colon    · COLONOSCOPY (GI, SURG); Status:Complete - Retrospective By Protocol  Authorization;   Done: 00GIO0731 12:00AM   · COLONOSCOPY (GI, SURG) ; every 10 years;  Last 25Apr2017; Next 25Apr2027;  Status:Active    Signatures   Electronically signed by : Lacey Lockhart, ; May 16 2017  4:55PM EST                       (Author)

## 2018-01-14 VITALS
SYSTOLIC BLOOD PRESSURE: 118 MMHG | WEIGHT: 179 LBS | HEART RATE: 89 BPM | HEIGHT: 66 IN | TEMPERATURE: 98.8 F | RESPIRATION RATE: 16 BRPM | BODY MASS INDEX: 28.77 KG/M2 | DIASTOLIC BLOOD PRESSURE: 82 MMHG

## 2018-01-15 NOTE — MISCELLANEOUS
Message   Recorded as Task   Date: 04/24/2017 04:51 PM, Created By: Galo Wells   Task Name: Go to Result   Assigned To: [facility],Team   Regarding Patient: Antonio Nunez, Status: Active   Comment:    Galo Wells - 24 Apr 2017 4:51 PM     TASK CREATED  See  in office to plan surgery  Good interval improvement  However also discussed with oncology  Joe Taylor - 25 Apr 2017 9:38 AM     TASK EDITED  Patient is scheduled to come in on 04/27/2017 @ 12:45 at the [facility]  Will confirm with patient on 04/26/2017 when calling for S/P colonoscopy call  Active Problems    1  At risk for colon cancer (V49 89) (Z91 89)   2  Discharge from right nipple (611 79) (N64 52)   3  Diverticulitis of colon (562 11) (K57 32)   4  Left breast lump (611 72) (N63)   5  Malignant neoplasm of lower-outer quadrant of right female breast (174 5) (C50 511)    Current Meds   1  MoviPrep 100 GM Oral Solution Reconstituted; USE AS DIRECTED; Therapy: 16PYP5152 to (Last Rx:07Nov2016) Ordered   2  Prepopik 10-3 5-12 MG-GM-GM Oral Packet; DILUTE CONTENTS OF PACKET AND USE   AS DIRECTED FOR BOWEL PREP; Therapy: 28ASP9451 to (Last Rx:07Nov2016) Ordered   3  Prepopik 10-3 5-12 MG-GM-GM Oral Packet; DILUTE CONTENTS OF PACKET AND USE   AS DIRECTED FOR BOWEL PREP; Therapy: 11CCN5658 to (Last Rx:31Mar2017) Ordered    Allergies    1  amoxicillin   2  Flagyl   3   Penicillins    Signatures   Electronically signed by : Uma Keene, ; Apr 25 2017  9:38AM EST                       (Author)

## 2018-01-15 NOTE — MISCELLANEOUS
Message  Lengthy discussion with Dr Alta Tee in radiology regarding the MRI and then later with the patient  MRI, there is extensive disease, multifocal, generally in one quadrant between 7 and 9:00, anteriorly almost entirely posteriorly, with extensive amount of disease in a diffuse setting  It is certainly multifocal although it cannot be obtained ascertained if it is multi quadrant  At this point radiology recommends a mastectomy as a lumpectomy would be very challenging to get a reasonable cosmetic results  There is also a questionable lymph node  This was discussed with medical oncology, he has a medical oncologist seen in the past  They will proceed with a workup and consider neoadjuvant chemotherapy  All this was discussed at length and explained to the patient by telephone conversation on Friday afternoon  Be contacting both plastic surgery for reconstructive options as well as her medical oncologist to consider neoadjuvant chemotherapy           Signatures   Electronically signed by : Cindy Hernandez MD; Dec  5 2016 10:15AM EST                       (Author)

## 2018-01-16 NOTE — MISCELLANEOUS
Message  Return to work or school:   [facility] is under my professional care   She was seen in my office on 11/07/16 and 11/14/16             Signatures   Electronically signed by : Luanne Castleman, ; Dec  5 2016  4:13PM EST                       (Author)

## 2018-01-16 NOTE — MISCELLANEOUS
Message   Recorded as Task   Date: 11/18/2016 11:04 AM, Created By: [facility]   Task Name: Follow Up   Assigned To: [facility],Team   Regarding Patient: Ty Fagan, Status: Active   CommentFish Rubin - 18 Nov 2016 11:04 AM     TASK CREATED    [facility] Genetic test in lab processing    path pending as of 11/18/16   [facility] - 21 Nov 2016 1:59 PM     TASK EDITED  Path still pending  [facility] - 23 Nov 2016 9:14 AM     TASK EDITED  Results still pending  [facility] - 25 Nov 2016 8:35 AM     TASK EDITED  Genetic test results in  Result is negative - no clinically significant mutation identified  Called pt and informed her the testing was negative  Pt was very happy to hear and thanked me for the call  Active Problems    1  Discharge from right nipple (611 79) (N64 52)   2  Diverticulitis of colon (562 11) (K57 32)   3  Left breast lump (611 72) (N63)   4  Malignant neoplasm of lower-outer quadrant of right female breast (174 5) (C50 511)    Current Meds   1  MoviPrep 100 GM Oral Solution Reconstituted; USE AS DIRECTED; Therapy: 53TAE3769 to (Last Rx:07Nov2016) Ordered   2  Prepopik 10-3 5-12 MG-GM-GM Oral Packet; DILUTE CONTENTS OF PACKET AND USE   AS DIRECTED FOR BOWEL PREP; Therapy: 25MPC2197 to (Last Rx:07Nov2016) Ordered    Allergies    1  amoxicillin   2  Flagyl   3   Penicillins    Signatures   Electronically signed by : Gerry Spear, ; Nov 25 2016  8:35AM EST                       (Author)

## 2018-01-16 NOTE — MISCELLANEOUS
Message   Recorded as Task   Date: 04/26/2017 10:29 AM, Created By: DARIA MARTIN   Task Name: Follow Up   [facility]   Regarding Patient: Filomena Schultz, Status: In Progress   Comment:    Davida Alcantar - 26 Apr 2017 10:29 AM     TASK CREATED  Routine S/P call made to patient for Colonoscopy/EGD on 04/25/2017  Patient stated she is doing well  Patient denies N/V/F/C  LBM- had a few BMs the night of 04/25/2017  Patient denies rectal bleeding or blood in stool  Patient aware that pathology is pending and will receive a return call with results  Patient stated understanding and had no questions or concerns  Confirmed patient appointment with Dr Juan Manuel Angel on 04/27/2017 @ 12:45A in the [facility] office  Kimberly Dill - 26 Apr 2017 10:30 AM     TASK IN PROGRESS   Heather Meyers - 02 May 2017 11:22 AM     TASK EDITED  Attempted to contact patient regarding pathology results = voice mailbox is not set up  Pathology results were received and reviewed by Dr Juan Manuel Angel (all negative)  Unsure if patient was given results at last weeks appt or note  Heather Meyers - 04 May 2017 11:35 AM     TASK EDITED  Called and spoke to patient  Pathology results were given and understood  Active Problems    1  At risk for colon cancer (V49 89) (Z91 89)   2  Discharge from right nipple (611 79) (N64 52)   3  Diverticulitis of colon (562 11) (K57 32)   4  Left breast lump (611 72) (N63)   5  Malignant neoplasm of lower-outer quadrant of right female breast (174 5) (C50 511)    Current Meds   1  MoviPrep 100 GM Oral Solution Reconstituted; USE AS DIRECTED; Therapy: 66MZL3250 to (Last Rx:07Nov2016) Ordered   2  Prepopik 10-3 5-12 MG-GM-GM Oral Packet; DILUTE CONTENTS OF PACKET AND USE   AS DIRECTED FOR BOWEL PREP; Therapy: 20PCL1600 to (Last Rx:07Nov2016) Ordered   3  Prepopik 10-3 5-12 MG-GM-GM Oral Packet; DILUTE CONTENTS OF PACKET AND USE   AS DIRECTED FOR BOWEL PREP;    Therapy: 67GPX0231 to (Last Rx:41Nbu8159) Ordered    Allergies    1  amoxicillin   2  Flagyl   3   Penicillins    Signatures   Electronically signed by : Lacey Lockhart, ; May  4 2017 11:35AM EST                       (Author)

## 2018-01-16 NOTE — MISCELLANEOUS
Message   Date: 05 Dec 2016 4:14 PM EST, Recorded By: Papa Kilgore For: [facility]   Pt called office requesting work note for days she had appointments in office 11/7/16 and 11/14/16  Faxed to [phone]  Active Problems    1  Discharge from right nipple (611 79) (N64 52)   2  Diverticulitis of colon (562 11) (K57 32)   3  Left breast lump (611 72) (N63)   4  Malignant neoplasm of lower-outer quadrant of right female breast (174 5) (C50 511)    Current Meds   1  MoviPrep 100 GM Oral Solution Reconstituted; USE AS DIRECTED; Therapy: 52MVT0905 to (Last Rx:07Nov2016) Ordered   2  Prepopik 10-3 5-12 MG-GM-GM Oral Packet; DILUTE CONTENTS OF PACKET AND USE   AS DIRECTED FOR BOWEL PREP; Therapy: 39NSC2515 to (Last Rx:07Nov2016) Ordered    Allergies    1  amoxicillin   2  Flagyl   3   Penicillins    Signatures   Electronically signed by : Paras Diaz, ; Dec  5 2016  4:14PM EST                       (Author)

## 2018-01-16 NOTE — MISCELLANEOUS
Message   Date: 11 Nov 2016 4:14 PM EST, Recorded By: Federico Arroyo For: [facility]   Biopsy final results in [facility]  Scheduled pt to be seen in office on Monday 11/14/16 to discuss results with Dr Mendez  Active Problems    1  Discharge from right nipple (611 79) (N64 52)   2  Diverticulitis of colon (562 11) (K57 32)   3  Left breast lump (611 72) (N63)    Current Meds   1  MoviPrep 100 GM Oral Solution Reconstituted; USE AS DIRECTED; Therapy: 59GCD9471 to (Last Rx:07Nov2016) Ordered   2  Prepopik 10-3 5-12 MG-GM-GM Oral Packet; DILUTE CONTENTS OF PACKET AND USE   AS DIRECTED FOR BOWEL PREP; Therapy: 89UOX9184 to (Last Rx:07Nov2016) Ordered    Allergies    1  amoxicillin   2  Flagyl   3   Penicillins    Signatures   Electronically signed by : Donte Blanchard, ; Nov 11 2016  4:15PM EST                       (Author)

## 2018-01-17 NOTE — MISCELLANEOUS
Message   Recorded as Task   Date: 06/02/2017 11:42 AM, Created By: Galo Wells   Task Name: Care Coordination   Assigned To: [facility] SURGICAL [facility],Team   Regarding Patient: Antonio Nunez, Status: Active   Comment:    Romana Mendez - 02 Jun 2017 11:42 AM     TASK CREATED  T2N1    called patient with results, still 3 7 cm tumor and 2/8 LNs positive      margins negative  pt aware  faxed to Dr Sadiq Pal office  Ella Rodrigues - 08 Jun 2017 11:48 AM     TASK EDITED  faxed to Dr Vandana Marks office  Active Problems    1  At risk for colon cancer (V49 89) (Z91 89)   2  Discharge from right nipple (611 79) (N64 52)   3  Diverticulitis of colon (562 11) (K57 32)   4  Left breast lump (611 72) (N63)   5  Malignant neoplasm of lower-outer quadrant of right female breast (174 5) (C50 511)    Current Meds   1  MoviPrep 100 GM Oral Solution Reconstituted; USE AS DIRECTED; Therapy: 91ZGB8628 to (Last Rx:07Nov2016) Ordered   2  Prepopik 10-3 5-12 MG-GM-GM Oral Packet; DILUTE CONTENTS OF PACKET AND USE   AS DIRECTED FOR BOWEL PREP; Therapy: 90ROO7302 to (Last Rx:07Nov2016) Ordered   3  Prepopik 10-3 5-12 MG-GM-GM Oral Packet; DILUTE CONTENTS OF PACKET AND USE   AS DIRECTED FOR BOWEL PREP; Therapy: 18LQY4861 to (Last Rx:31Mar2017) Ordered    Allergies    1  amoxicillin   2  Flagyl   3   Penicillins    Signatures   Electronically signed by : Graciela Ware, ; Jun 2 2017 11:49AM EST                       (Author)

## 2018-01-18 NOTE — MISCELLANEOUS
Message   Recorded as Task   Date: 06/16/2017 12:15 PM, Created By: Tyrone Yusuf   Task Name: Go to Result   Assigned To: [facility],Team   Regarding Patient: Elaine Dandy, Status: Active   CommentChilton Shoulders - 16 Jun 2017 12:15 PM     TASK CREATED  Please make sure Dr Hope Keller has a copy of the corrected report  Heather Meyers - 16 Jun 2017 12:56 PM     TASK EDITED  Faxed corrected pathology to Dr Stephanie Childress office  Active Problems    1  At risk for colon cancer (V49 89) (Z91 89)   2  Discharge from right nipple (611 79) (N64 52)   3  Diverticulitis of colon (562 11) (K57 32)   4  Left breast lump (611 72) (N63)   5  Malignant neoplasm of lower-outer quadrant of right female breast (174 5) (C50 511)    Current Meds   1  Metoprolol Succinate ER 25 MG Oral Tablet Extended Release 24 Hour; Therapy: (Recorded:05Jun2017) to Recorded   2  Probiotic Oral Capsule; Therapy: (Recorded:05Jun2017) to Recorded   3  Vitamin D 25522 UNIT CAPS; Therapy: (Recorded:05Jun2017) to Recorded    Allergies    1  amoxicillin   2  Flagyl   3   Penicillins    Signatures   Electronically signed by : Cookie Mistry, ; Jun 16 2017 12:57PM EST                       (Author)

## 2018-02-12 ENCOUNTER — HOSPITAL ENCOUNTER (OUTPATIENT)
Dept: RADIOLOGY | Age: 48
Discharge: HOME | End: 2018-02-12
Payer: COMMERCIAL

## 2018-02-12 DIAGNOSIS — C50.911 MALIGNANT NEOPLASM OF RIGHT FEMALE BREAST (HCC): ICD-10-CM

## 2018-02-12 LAB — GLUCOSE SERPL-MCNC: 82 MG/DL (ref 65–140)

## 2018-02-12 PROCEDURE — 82948 REAGENT STRIP/BLOOD GLUCOSE: CPT

## 2018-02-12 PROCEDURE — 78815 PET IMAGE W/CT SKULL-THIGH: CPT

## 2018-02-12 PROCEDURE — A9552 F18 FDG: HCPCS

## 2018-02-12 RX ADMIN — IOHEXOL 5 ML: 240 INJECTION, SOLUTION INTRATHECAL; INTRAVASCULAR; INTRAVENOUS; ORAL at 11:52

## 2018-02-20 RX ORDER — CHOLECALCIFEROL (VITAMIN D3) 1250 MCG
CAPSULE ORAL
COMMUNITY

## 2018-02-23 ENCOUNTER — OFFICE VISIT (OUTPATIENT)
Dept: SURGERY | Facility: CLINIC | Age: 48
End: 2018-02-23
Payer: COMMERCIAL

## 2018-02-23 VITALS
HEIGHT: 66 IN | HEART RATE: 80 BPM | SYSTOLIC BLOOD PRESSURE: 122 MMHG | DIASTOLIC BLOOD PRESSURE: 84 MMHG | RESPIRATION RATE: 18 BRPM | TEMPERATURE: 98.6 F | BODY MASS INDEX: 29.25 KG/M2 | WEIGHT: 182 LBS

## 2018-02-23 DIAGNOSIS — Z95.828 PORT CATHETER IN PLACE: Primary | ICD-10-CM

## 2018-02-23 PROCEDURE — 99214 OFFICE O/P EST MOD 30 MIN: CPT | Performed by: PHYSICIAN ASSISTANT
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
## 2018-02-28 ENCOUNTER — ANESTHESIA EVENT (OUTPATIENT)
Dept: PERIOP | Facility: HOSPITAL | Age: 48
End: 2018-02-28
Payer: COMMERCIAL

## 2018-03-01 ENCOUNTER — HOSPITAL ENCOUNTER (OUTPATIENT)
Facility: HOSPITAL | Age: 48
Setting detail: OUTPATIENT SURGERY
Discharge: HOME | End: 2018-03-01
Attending: SURGERY | Admitting: SURGERY
Payer: COMMERCIAL

## 2018-03-01 ENCOUNTER — ANESTHESIA (OUTPATIENT)
Dept: PERIOP | Facility: HOSPITAL | Age: 48
End: 2018-03-01
Payer: COMMERCIAL

## 2018-03-01 VITALS
DIASTOLIC BLOOD PRESSURE: 60 MMHG | RESPIRATION RATE: 18 BRPM | HEART RATE: 95 BPM | OXYGEN SATURATION: 97 % | TEMPERATURE: 98.7 F | SYSTOLIC BLOOD PRESSURE: 135 MMHG

## 2018-03-01 DIAGNOSIS — C50.911 MALIGNANT NEOPLASM OF RIGHT FEMALE BREAST, UNSPECIFIED ESTROGEN RECEPTOR STATUS, UNSPECIFIED SITE OF BREAST (HCC): Primary | ICD-10-CM

## 2018-03-01 LAB — EXT PREGNANCY TEST URINE: NEGATIVE

## 2018-03-01 PROCEDURE — 81025 URINE PREGNANCY TEST: CPT | Performed by: ANESTHESIOLOGY

## 2018-03-01 PROCEDURE — 36590 REMOVAL TUNNELED CV CATH: CPT | Performed by: SURGERY

## 2018-03-01 RX ORDER — ACETAMINOPHEN 325 MG/1
650 TABLET ORAL EVERY 6 HOURS PRN
Status: DISCONTINUED | OUTPATIENT
Start: 2018-03-01 | End: 2018-03-01 | Stop reason: HOSPADM

## 2018-03-01 RX ORDER — FENTANYL CITRATE/PF 50 MCG/ML
50 SYRINGE (ML) INJECTION
Status: DISCONTINUED | OUTPATIENT
Start: 2018-03-01 | End: 2018-03-01 | Stop reason: HOSPADM

## 2018-03-01 RX ORDER — HYDROCODONE BITARTRATE AND ACETAMINOPHEN 5; 325 MG/1; MG/1
1 TABLET ORAL EVERY 4 HOURS PRN
Status: DISCONTINUED | OUTPATIENT
Start: 2018-03-01 | End: 2018-03-01 | Stop reason: HOSPADM

## 2018-03-01 RX ORDER — ONDANSETRON 2 MG/ML
4 INJECTION INTRAMUSCULAR; INTRAVENOUS EVERY 4 HOURS PRN
Status: DISCONTINUED | OUTPATIENT
Start: 2018-03-01 | End: 2018-03-01 | Stop reason: HOSPADM

## 2018-03-01 RX ORDER — MIDAZOLAM HYDROCHLORIDE 1 MG/ML
INJECTION INTRAMUSCULAR; INTRAVENOUS AS NEEDED
Status: DISCONTINUED | OUTPATIENT
Start: 2018-03-01 | End: 2018-03-01 | Stop reason: SURG

## 2018-03-01 RX ORDER — ONDANSETRON 2 MG/ML
INJECTION INTRAMUSCULAR; INTRAVENOUS AS NEEDED
Status: DISCONTINUED | OUTPATIENT
Start: 2018-03-01 | End: 2018-03-01 | Stop reason: SURG

## 2018-03-01 RX ORDER — DEXTROSE AND SODIUM CHLORIDE 5; .45 G/100ML; G/100ML
80 INJECTION, SOLUTION INTRAVENOUS CONTINUOUS
Status: DISCONTINUED | OUTPATIENT
Start: 2018-03-01 | End: 2018-03-01 | Stop reason: HOSPADM

## 2018-03-01 RX ORDER — FENTANYL CITRATE 50 UG/ML
INJECTION, SOLUTION INTRAMUSCULAR; INTRAVENOUS AS NEEDED
Status: DISCONTINUED | OUTPATIENT
Start: 2018-03-01 | End: 2018-03-01 | Stop reason: SURG

## 2018-03-01 RX ORDER — SODIUM CHLORIDE 9 MG/ML
125 INJECTION, SOLUTION INTRAVENOUS CONTINUOUS
Status: DISCONTINUED | OUTPATIENT
Start: 2018-03-01 | End: 2018-03-01 | Stop reason: HOSPADM

## 2018-03-01 RX ORDER — MAGNESIUM HYDROXIDE 1200 MG/15ML
LIQUID ORAL AS NEEDED
Status: DISCONTINUED | OUTPATIENT
Start: 2018-03-01 | End: 2018-03-01 | Stop reason: HOSPADM

## 2018-03-01 RX ORDER — MEPERIDINE HYDROCHLORIDE 50 MG/ML
12.5 INJECTION INTRAMUSCULAR; INTRAVENOUS; SUBCUTANEOUS ONCE AS NEEDED
Status: DISCONTINUED | OUTPATIENT
Start: 2018-03-01 | End: 2018-03-01 | Stop reason: HOSPADM

## 2018-03-01 RX ORDER — PROPOFOL 10 MG/ML
INJECTION, EMULSION INTRAVENOUS AS NEEDED
Status: DISCONTINUED | OUTPATIENT
Start: 2018-03-01 | End: 2018-03-01 | Stop reason: SURG

## 2018-03-01 RX ORDER — CLINDAMYCIN PHOSPHATE 900 MG/50ML
900 INJECTION INTRAVENOUS ONCE
Status: COMPLETED | OUTPATIENT
Start: 2018-03-01 | End: 2018-03-01

## 2018-03-01 RX ORDER — ONDANSETRON 4 MG/1
4 TABLET, ORALLY DISINTEGRATING ORAL EVERY 6 HOURS PRN
Status: DISCONTINUED | OUTPATIENT
Start: 2018-03-01 | End: 2018-03-01 | Stop reason: HOSPADM

## 2018-03-01 RX ORDER — ONDANSETRON 2 MG/ML
4 INJECTION INTRAMUSCULAR; INTRAVENOUS ONCE AS NEEDED
Status: DISCONTINUED | OUTPATIENT
Start: 2018-03-01 | End: 2018-03-01 | Stop reason: HOSPADM

## 2018-03-01 RX ORDER — MORPHINE SULFATE 2 MG/ML
2 INJECTION, SOLUTION INTRAMUSCULAR; INTRAVENOUS EVERY 2 HOUR PRN
Status: DISCONTINUED | OUTPATIENT
Start: 2018-03-01 | End: 2018-03-01 | Stop reason: HOSPADM

## 2018-03-01 RX ORDER — HYDROCODONE BITARTRATE AND ACETAMINOPHEN 5; 325 MG/1; MG/1
1-2 TABLET ORAL EVERY 4 HOURS PRN
Qty: 10 TABLET | Refills: 0 | Status: SHIPPED | OUTPATIENT
Start: 2018-03-01

## 2018-03-01 RX ORDER — LIDOCAINE HYDROCHLORIDE 10 MG/ML
INJECTION, SOLUTION INFILTRATION; PERINEURAL AS NEEDED
Status: DISCONTINUED | OUTPATIENT
Start: 2018-03-01 | End: 2018-03-01 | Stop reason: HOSPADM

## 2018-03-01 RX ADMIN — ACETAMINOPHEN 650 MG: 325 TABLET, FILM COATED ORAL at 13:27

## 2018-03-01 RX ADMIN — SODIUM CHLORIDE: 0.9 INJECTION, SOLUTION INTRAVENOUS at 11:17

## 2018-03-01 RX ADMIN — CLINDAMYCIN PHOSPHATE 900 MG: 18 INJECTION, SOLUTION INTRAMUSCULAR; INTRAVENOUS at 11:11

## 2018-03-01 RX ADMIN — FENTANYL CITRATE 100 MCG: 50 INJECTION, SOLUTION INTRAMUSCULAR; INTRAVENOUS at 11:04

## 2018-03-01 RX ADMIN — ONDANSETRON HYDROCHLORIDE 4 MG: 2 INJECTION, SOLUTION INTRAVENOUS at 11:09

## 2018-03-01 RX ADMIN — SODIUM CHLORIDE 125 ML/HR: 0.9 INJECTION, SOLUTION INTRAVENOUS at 09:17

## 2018-03-01 RX ADMIN — LIDOCAINE HYDROCHLORIDE 100 MG: 20 INJECTION, SOLUTION INTRAVENOUS at 11:13

## 2018-03-01 RX ADMIN — MIDAZOLAM HYDROCHLORIDE 2 MG: 1 INJECTION, SOLUTION INTRAMUSCULAR; INTRAVENOUS at 11:04

## 2018-03-01 RX ADMIN — PROPOFOL 200 MG: 10 INJECTION, EMULSION INTRAVENOUS at 11:13
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
## 2018-03-01 NOTE — DISCHARGE SUMMARY
Discharge Summary - [place] Park 56 y o  female MRN: [id number]    Unit/Bed#: OR [place] Encounter: [id number]      Pre-Operative Diagnosis: Pre-Op Diagnosis Codes:     * Port catheter in place [Z95 828]    Post-Operative Diagnosis: Post-Op Diagnosis Codes:     * Port catheter in place [Z95 828]    Procedures Performed:  Procedure(s):  REMOVAL VENOUS PORT (PORT-A-CATH)    Surgeon: Imelda Camargo MD    See H & P for full details of admission and Operative Note for full details of operations performed  Patient was seen and examined prior to discharge  Provisions for Follow-Up Care:  See After Visit Summary for information related to follow-up care and home orders  Disposition: Home, in stable condition  Planned Readmission: No    Discharge Medications:  See after visit summary for reconciled discharge medications provided to patient and family  Post Operative instructions: Reviewed with patient and/or family  Some portions of this record may have been generated with voice recognition software  There may be translation, syntax,  or grammatical errors  Occasional wrong word or "sound-a-like" substitutions may have occurred due to the inherent limitations of the voice recognition software  Read the chart carefully and recognize, using context, where substitutions may have occurred  If you have any questions, please contact the dictating provider for clarification or correction, as needed  This encounter has been coded by non certified Coder      Signature:   Imelda Camargo MD  Date: 3/1/2018 Time: 11:45 AM

## 2018-03-01 NOTE — OP NOTE
REMOVAL VENOUS PORT (PORT-A-CATH)  Postoperative Note  PATIENT NAME: Rolo Anguiano  : 1970  MRN: [id number]  [facility]    Surgery Date: 3/1/2018    Port catheter in place [Z95 828]    Operative Indications:  Port-a-Cath no longer needed      Consent:  The risks, benefits, and alternatives to the surgery were discussed with the patient and with the family prior to surgery, personally by Dr Freddy Yeung  If the consent was obtained by the physician assistant or other representative, the consent was reviewed once again personally by the operating physician  Common complications particular for this procedure as well as unusual complications were discussed, including but not limited to:  bleeding, wound infection, prolonged wound healing, open wounds, reoperation, leak from the bowel or viscus, leak from the bile duct or injury to adjacent or other organs or blood vessels in the abdomen  A  was used if necessary  The patient expressed understanding of the issues discussed and wished and consented to the procedure to proceed  All questions were answered  Dr Freddy Yeung personally discussed the informed consent with this patient  Operative Findings:  port    Post-Op Diagnosis Codes:     * Port catheter in place [M09 219]    Procedure(s):  REMOVAL VENOUS PORT (PORT-A-CATH)    Surgeon(s) and Role:     * Amadou Romero MD - Primary    No qualified resident was available  I was present for the entire procedure  Drains:       Specimens:  * No specimens in log *    Estimated Blood Loss:   Minimal    Anesthesia Type:   Choice     Findings:   The patient was brought to the operating room and identified  Location of the procedure site was confirmed with the patient  She underwent anesthesia  A time-out was performed  Local anesthesia was used  An elliptical incision was made and the scar measuring 3 cm was removed with skin and soft tissue  Dissection carried out to the port   The 3 sutures were removed  A figure-of-eight suture was placed around the tunnel  The port was removed, the tip intact, the suture tied down and compression held for 5 minutes  Hemostasis was assured  The wound was irrigated and closed in multiple layers using 3-0 Vicryl suture, 4-0 Monocryl subcuticular stitches  The wounds were dressed  The patient tolerated the procedure well  Some portions of this records may have been generated with voice recognition software  There may be translation, syntax,  or grammatical errors  Occasional wrong word or "sound-a-like" substitutions may have occurred due to the inherent limitations of the voice recognition software  Read the chart carefully and recognize, using context, where substations may have occurred  If you have any questions, please contact the dictating provider for clarification or correction, as needed         Condition: Stable    SIGNATURE: Leigh Smith MD   DATE: March 1, 2018   TIME: 11:44 AM

## 2018-03-01 NOTE — DISCHARGE INSTRUCTIONS
[facility] Instructions  Dr Janis Schumacher MD, FACS    1  General: You will feel pulling sensations around the wound or funny aches and pains around the incisions  This is normal  Even minor surgery is a change in your body and this is your bodys way of reaction to it  If you have had abdominal surgery, it may help to support the incision with a small pillow or blanket for comfort when moving or coughing  2  Wound care: Make sure to remove the bandage in about 24 hours, unless instructed otherwise  You usually don't have to redress the wound after 24-48 hours, unless for comfort  Keep the incision clean and dry  Let air get to it  If this Steri-Strips fall off, just keep the wound clean  3  Water: You may shower over the wound, unless there are drain tubes left in place  Do not bathe or use a pool or hot tub until cleared by the physician  You may shower right over the staples or Steri-Strips and packing dry when you are done  4  Activity: You may go up and down stairs, walk as much as you are comfortable, but walk at least 3 times each day  If you have had abdominal surgery, do not lift anything heavier than 15 pounds for at least 2-4 weeks, unless cleared by the doctor  5  Diet: You may resume a regular diet  If you had a same-day surgery or overnight stay surgery, you may wish to eat lightly for a few days: soups, crackers, and sandwiches  You may resume a regular diet when ready  6  Medications: Resume all of your previous medications, unless told otherwise by the doctor  Avoid aspirin or ibuprofen (Advil, Motrin, etc ) products for 2-3 days after the date of surgery  You may, at that time, began to take them again  Tylenol is always fine, unless you are taking any narcotic pain medication containing Tylenol (such as Percocet, Darvocet, Vicodin, or anything containing acetaminophen)  Do not take Tylenol if you're taking these medications   You do not need to take the narcotic pain medications unless you are having significant pain and discomfort  7  Driving: You will need someone to drive you home on the day of surgery  Do not drive or make any important decisions while on narcotic pain medication or 24 hours and after anesthesia or sedation for surgery  Generally, you may drive when your off all narcotic pain medications  8  Upset Stomach: You may take Maalox, Tums, or similar items for an upset stomach  If your narcotic pain medication causes an upset stomach, do not take it on an empty stomach  Try taking it with at least some crackers or toast      9  Constipation: Patients often experienced constipation after surgery  You may take over-the-counter medication for this, such as Metamucil, Senokot, Dulcolax, milk of magnesia, etc  You may take a suppository unless you have had anorectal surgery such as a procedure on your hemorrhoids  If you experience significant nausea or vomiting after abdominal surgery, call the office before trying any of these medications  10  Call the office: If you are experiencing any of the following, fevers above 101 5°, significant nausea or vomiting, if the wound develops drainage and/or is excessive redness around the wound, or if you have significant diarrhea or other worsening symptoms  11  Pain: You may be given a prescription for pain  This will be given to the hospital, the day of surgery  12  Sexual Activity: You may resume sexual activity when you feel ready and comfortable and your incision is sealed and healed without apparent infection risk  13  Urination: If you haven't urinated in 6 hours, go directly to the ER for evaluation for urinary retention       [address], [address]  [place], [address]   Phone: [phone]
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
## 2018-03-02 ENCOUNTER — TELEPHONE (OUTPATIENT)
Dept: SURGERY | Facility: CLINIC | Age: 48
End: 2018-03-02

## 2018-03-02 NOTE — TELEPHONE ENCOUNTER
Spoke w/ pt post port removal and she is doing well  Sore but not having a lot of pain  Ambulating ad gisele and has returned to everyday adl's  Some fatigue noted today, incision clean and dry no drainage noted  Advised to contact office w/ questions or concerns

## 2018-05-23 ENCOUNTER — LAB REQUISITION (OUTPATIENT)
Dept: LAB | Facility: HOSPITAL | Age: 48
End: 2018-05-23
Payer: COMMERCIAL

## 2018-05-23 DIAGNOSIS — D50.8 OTHER IRON DEFICIENCY ANEMIAS: ICD-10-CM

## 2018-05-23 DIAGNOSIS — C50.511 MALIGNANT NEOPLASM OF LOWER-OUTER QUADRANT OF RIGHT FEMALE BREAST (HCC): ICD-10-CM

## 2018-05-23 DIAGNOSIS — M79.10 MYALGIA: ICD-10-CM

## 2018-05-23 LAB
CRP SERPL QL: 3.8 MG/L
ERYTHROCYTE [SEDIMENTATION RATE] IN BLOOD: 17 MM/HOUR (ref 0–20)
T3FREE SERPL-MCNC: 3.27 PG/ML (ref 2.3–4.2)
T4 FREE SERPL-MCNC: 0.84 NG/DL (ref 0.76–1.46)

## 2018-05-23 PROCEDURE — 85652 RBC SED RATE AUTOMATED: CPT | Performed by: NURSE PRACTITIONER

## 2018-05-23 PROCEDURE — 84439 ASSAY OF FREE THYROXINE: CPT | Performed by: NURSE PRACTITIONER

## 2018-05-23 PROCEDURE — 86140 C-REACTIVE PROTEIN: CPT | Performed by: NURSE PRACTITIONER

## 2018-05-23 PROCEDURE — 84481 FREE ASSAY (FT-3): CPT | Performed by: NURSE PRACTITIONER

## 2018-05-24 ENCOUNTER — TRANSCRIBE ORDERS (OUTPATIENT)
Dept: ADMINISTRATIVE | Facility: HOSPITAL | Age: 48
End: 2018-05-24

## 2018-05-24 DIAGNOSIS — N64.4 BREAST PAIN: Primary | ICD-10-CM

## 2018-06-07 ENCOUNTER — HOSPITAL ENCOUNTER (OUTPATIENT)
Dept: RADIOLOGY | Facility: HOSPITAL | Age: 48
Discharge: HOME | End: 2018-06-07
Attending: INTERNAL MEDICINE
Payer: COMMERCIAL

## 2018-06-07 DIAGNOSIS — N64.4 BREAST PAIN: ICD-10-CM

## 2018-06-07 PROCEDURE — C8908 MRI W/O FOL W/CONT, BREAST,: HCPCS

## 2018-06-07 PROCEDURE — A9585 GADOBUTROL INJECTION: HCPCS | Performed by: RADIOLOGY

## 2018-06-07 RX ADMIN — GADOBUTROL 8 ML: 604.72 INJECTION INTRAVENOUS at 16:40

## 2018-10-31 ENCOUNTER — TRANSCRIBE ORDERS (OUTPATIENT)
Dept: ADMINISTRATIVE | Facility: HOSPITAL | Age: 48
End: 2018-10-31

## 2018-10-31 ENCOUNTER — LAB REQUISITION (OUTPATIENT)
Dept: LAB | Facility: HOSPITAL | Age: 48
End: 2018-10-31
Payer: COMMERCIAL

## 2018-10-31 DIAGNOSIS — D50.8 OTHER IRON DEFICIENCY ANEMIAS: ICD-10-CM

## 2018-10-31 DIAGNOSIS — N64.4 BREAST PAIN: ICD-10-CM

## 2018-10-31 DIAGNOSIS — C50.511 MALIGNANT NEOPLASM OF LOWER-OUTER QUADRANT OF RIGHT FEMALE BREAST, UNSPECIFIED ESTROGEN RECEPTOR STATUS (HCC): Primary | ICD-10-CM

## 2018-10-31 DIAGNOSIS — N63.0 BREAST NODULE: ICD-10-CM

## 2018-10-31 DIAGNOSIS — R79.89 ELEVATED TSH: ICD-10-CM

## 2018-10-31 DIAGNOSIS — E06.3 HASHIMOTO'S DISEASE: Primary | ICD-10-CM

## 2018-10-31 DIAGNOSIS — C50.511 MALIGNANT NEOPLASM OF LOWER-OUTER QUADRANT OF RIGHT FEMALE BREAST (HCC): ICD-10-CM

## 2018-10-31 LAB
ESTRADIOL SERPL-MCNC: 13 PG/ML
T3FREE SERPL-MCNC: 2.97 PG/ML (ref 2.3–4.2)
T4 FREE SERPL-MCNC: 0.86 NG/DL (ref 0.76–1.46)

## 2018-10-31 PROCEDURE — 83918 ORGANIC ACIDS TOTAL QUANT: CPT | Performed by: NURSE PRACTITIONER

## 2018-10-31 PROCEDURE — 84481 FREE ASSAY (FT-3): CPT | Performed by: NURSE PRACTITIONER

## 2018-10-31 PROCEDURE — 84439 ASSAY OF FREE THYROXINE: CPT | Performed by: NURSE PRACTITIONER

## 2018-10-31 PROCEDURE — 82670 ASSAY OF TOTAL ESTRADIOL: CPT | Performed by: NURSE PRACTITIONER

## 2018-11-02 LAB
METHYLMALONATE SERPL-SCNC: 387 NMOL/L (ref 0–378)
SL AMB DISCLAIMER: ABNORMAL

## 2018-12-28 ENCOUNTER — HOSPITAL ENCOUNTER (OUTPATIENT)
Dept: RADIOLOGY | Facility: HOSPITAL | Age: 48
Discharge: HOME | End: 2018-12-28
Attending: INTERNAL MEDICINE
Payer: COMMERCIAL

## 2018-12-28 DIAGNOSIS — C50.511 MALIGNANT NEOPLASM OF LOWER-OUTER QUADRANT OF RIGHT FEMALE BREAST, UNSPECIFIED ESTROGEN RECEPTOR STATUS (HCC): ICD-10-CM

## 2018-12-28 DIAGNOSIS — E06.3 HASHIMOTO'S DISEASE: ICD-10-CM

## 2018-12-28 PROCEDURE — C8908 MRI W/O FOL W/CONT, BREAST,: HCPCS

## 2018-12-28 PROCEDURE — A9585 GADOBUTROL INJECTION: HCPCS | Performed by: INTERNAL MEDICINE

## 2018-12-28 PROCEDURE — 76536 US EXAM OF HEAD AND NECK: CPT

## 2018-12-28 RX ADMIN — GADOBUTROL 8 ML: 604.72 INJECTION INTRAVENOUS at 15:03

## 2019-02-21 ENCOUNTER — LAB REQUISITION (OUTPATIENT)
Dept: LAB | Facility: HOSPITAL | Age: 49
End: 2019-02-21
Payer: COMMERCIAL

## 2019-02-21 DIAGNOSIS — C50.511 MALIGNANT NEOPLASM OF LOWER-OUTER QUADRANT OF RIGHT FEMALE BREAST (HCC): ICD-10-CM

## 2019-02-21 DIAGNOSIS — D50.8 OTHER IRON DEFICIENCY ANEMIAS: ICD-10-CM

## 2019-02-21 LAB — TSH SERPL DL<=0.05 MIU/L-ACNC: 3.04 UIU/ML (ref 0.36–3.74)

## 2019-02-21 PROCEDURE — 83918 ORGANIC ACIDS TOTAL QUANT: CPT | Performed by: NURSE PRACTITIONER

## 2019-02-21 PROCEDURE — 84443 ASSAY THYROID STIM HORMONE: CPT | Performed by: NURSE PRACTITIONER

## 2019-02-24 LAB
METHYLMALONATE SERPL-SCNC: 232 NMOL/L (ref 0–378)
SL AMB DISCLAIMER: NORMAL

## 2019-06-28 ENCOUNTER — TRANSCRIBE ORDERS (OUTPATIENT)
Dept: ADMINISTRATIVE | Facility: HOSPITAL | Age: 49
End: 2019-06-28

## 2019-06-28 DIAGNOSIS — C50.919 MALIGNANT NEOPLASM OF FEMALE BREAST, UNSPECIFIED ESTROGEN RECEPTOR STATUS, UNSPECIFIED LATERALITY, UNSPECIFIED SITE OF BREAST (HCC): ICD-10-CM

## 2019-06-28 DIAGNOSIS — C50.919 MALIGNANT NEOPLASM OF FEMALE BREAST, UNSPECIFIED ESTROGEN RECEPTOR STATUS, UNSPECIFIED LATERALITY, UNSPECIFIED SITE OF BREAST (HCC): Primary | ICD-10-CM

## 2019-06-28 DIAGNOSIS — K59.00 CONSTIPATION, UNSPECIFIED CONSTIPATION TYPE: ICD-10-CM

## 2019-06-28 DIAGNOSIS — R91.8 INFILTRATE OF UPPER LOBE OF RIGHT LUNG PRESENT ON IMAGING STUDY: ICD-10-CM

## 2019-06-28 DIAGNOSIS — Z00.00 ROUTINE GENERAL MEDICAL EXAMINATION AT A HEALTH CARE FACILITY: Primary | ICD-10-CM

## 2019-07-03 ENCOUNTER — HOSPITAL ENCOUNTER (OUTPATIENT)
Dept: BONE DENSITY | Facility: HOSPITAL | Age: 49
Discharge: HOME | End: 2019-07-03
Payer: COMMERCIAL

## 2019-07-03 DIAGNOSIS — Z00.00 ROUTINE GENERAL MEDICAL EXAMINATION AT A HEALTH CARE FACILITY: ICD-10-CM

## 2019-07-03 DIAGNOSIS — C50.919 MALIGNANT NEOPLASM OF FEMALE BREAST, UNSPECIFIED ESTROGEN RECEPTOR STATUS, UNSPECIFIED LATERALITY, UNSPECIFIED SITE OF BREAST (HCC): ICD-10-CM

## 2019-07-03 PROCEDURE — 77080 DXA BONE DENSITY AXIAL: CPT

## 2019-07-05 ENCOUNTER — HOSPITAL ENCOUNTER (OUTPATIENT)
Dept: RADIOLOGY | Facility: HOSPITAL | Age: 49
Discharge: HOME | End: 2019-07-05
Attending: INTERNAL MEDICINE
Payer: COMMERCIAL

## 2019-07-05 DIAGNOSIS — K59.00 CONSTIPATION, UNSPECIFIED CONSTIPATION TYPE: ICD-10-CM

## 2019-07-05 DIAGNOSIS — C50.919 MALIGNANT NEOPLASM OF FEMALE BREAST, UNSPECIFIED ESTROGEN RECEPTOR STATUS, UNSPECIFIED LATERALITY, UNSPECIFIED SITE OF BREAST (HCC): ICD-10-CM

## 2019-07-05 DIAGNOSIS — R91.8 INFILTRATE OF UPPER LOBE OF RIGHT LUNG PRESENT ON IMAGING STUDY: ICD-10-CM

## 2019-07-05 PROCEDURE — 71260 CT THORAX DX C+: CPT

## 2019-07-05 PROCEDURE — 74177 CT ABD & PELVIS W/CONTRAST: CPT

## 2019-07-05 RX ADMIN — IOHEXOL 80 ML: 350 INJECTION, SOLUTION INTRAVENOUS at 19:58

## 2019-11-11 ENCOUNTER — TRANSCRIBE ORDERS (OUTPATIENT)
Dept: ADMINISTRATIVE | Facility: HOSPITAL | Age: 49
End: 2019-11-11

## 2019-11-11 DIAGNOSIS — E06.3 CHRONIC LYMPHOCYTIC THYROIDITIS: ICD-10-CM

## 2019-11-11 DIAGNOSIS — D50.8 IRON DEFICIENCY ANEMIA SECONDARY TO INADEQUATE DIETARY IRON INTAKE: ICD-10-CM

## 2019-11-11 DIAGNOSIS — R07.89 STERNAL PAIN: ICD-10-CM

## 2019-11-11 DIAGNOSIS — C50.511 MALIGNANT NEOPLASM OF LOWER-OUTER QUADRANT OF RIGHT FEMALE BREAST, UNSPECIFIED ESTROGEN RECEPTOR STATUS (HCC): Primary | ICD-10-CM

## 2019-11-11 DIAGNOSIS — M54.9 BACK PAIN, UNSPECIFIED BACK LOCATION, UNSPECIFIED BACK PAIN LATERALITY, UNSPECIFIED CHRONICITY: ICD-10-CM

## 2019-11-14 ENCOUNTER — HOSPITAL ENCOUNTER (OUTPATIENT)
Dept: RADIOLOGY | Facility: HOSPITAL | Age: 49
Discharge: HOME | End: 2019-11-14
Attending: INTERNAL MEDICINE
Payer: COMMERCIAL

## 2019-11-14 ENCOUNTER — APPOINTMENT (OUTPATIENT)
Dept: LAB | Facility: HOSPITAL | Age: 49
End: 2019-11-14
Attending: INTERNAL MEDICINE
Payer: COMMERCIAL

## 2019-11-14 DIAGNOSIS — D50.8 IRON DEFICIENCY ANEMIA SECONDARY TO INADEQUATE DIETARY IRON INTAKE: ICD-10-CM

## 2019-11-14 DIAGNOSIS — M54.9 BACK PAIN, UNSPECIFIED BACK LOCATION, UNSPECIFIED BACK PAIN LATERALITY, UNSPECIFIED CHRONICITY: ICD-10-CM

## 2019-11-14 DIAGNOSIS — E06.3 CHRONIC LYMPHOCYTIC THYROIDITIS: ICD-10-CM

## 2019-11-14 DIAGNOSIS — C50.511 MALIGNANT NEOPLASM OF LOWER-OUTER QUADRANT OF RIGHT FEMALE BREAST, UNSPECIFIED ESTROGEN RECEPTOR STATUS (HCC): ICD-10-CM

## 2019-11-14 DIAGNOSIS — R07.89 STERNAL PAIN: ICD-10-CM

## 2019-11-14 LAB
CRP SERPL QL: 4.5 MG/L
ERYTHROCYTE [SEDIMENTATION RATE] IN BLOOD: 21 MM/HOUR (ref 0–20)
RHEUMATOID FACT SER QL LA: NEGATIVE
T3FREE SERPL-MCNC: 2.51 PG/ML (ref 2.3–4.2)
T4 FREE SERPL-MCNC: 0.9 NG/DL (ref 0.76–1.46)

## 2019-11-14 PROCEDURE — 84439 ASSAY OF FREE THYROXINE: CPT

## 2019-11-14 PROCEDURE — 36415 COLL VENOUS BLD VENIPUNCTURE: CPT

## 2019-11-14 PROCEDURE — 86038 ANTINUCLEAR ANTIBODIES: CPT

## 2019-11-14 PROCEDURE — 86140 C-REACTIVE PROTEIN: CPT

## 2019-11-14 PROCEDURE — 85652 RBC SED RATE AUTOMATED: CPT

## 2019-11-14 PROCEDURE — 86430 RHEUMATOID FACTOR TEST QUAL: CPT

## 2019-11-14 PROCEDURE — A9503 TC99M MEDRONATE: HCPCS

## 2019-11-14 PROCEDURE — 78306 BONE IMAGING WHOLE BODY: CPT

## 2019-11-14 PROCEDURE — 84481 FREE ASSAY (FT-3): CPT

## 2019-11-15 LAB — RYE IGE QN: NEGATIVE

## 2019-11-19 ENCOUNTER — TRANSCRIBE ORDERS (OUTPATIENT)
Dept: ADMINISTRATIVE | Facility: HOSPITAL | Age: 49
End: 2019-11-19

## 2019-11-19 DIAGNOSIS — C50.511 MALIGNANT NEOPLASM OF LOWER-OUTER QUADRANT OF RIGHT FEMALE BREAST, UNSPECIFIED ESTROGEN RECEPTOR STATUS (HCC): Primary | ICD-10-CM

## 2019-11-19 DIAGNOSIS — Z17.1 ESTROGEN RECEPTOR NEGATIVE STATUS (ER-): ICD-10-CM

## 2019-12-05 ENCOUNTER — HOSPITAL ENCOUNTER (OUTPATIENT)
Dept: RADIOLOGY | Facility: HOSPITAL | Age: 49
Discharge: HOME | End: 2019-12-05
Attending: INTERNAL MEDICINE
Payer: COMMERCIAL

## 2019-12-05 DIAGNOSIS — Z17.1 ESTROGEN RECEPTOR NEGATIVE STATUS (ER-): ICD-10-CM

## 2019-12-05 DIAGNOSIS — C50.511 MALIGNANT NEOPLASM OF LOWER-OUTER QUADRANT OF RIGHT FEMALE BREAST, UNSPECIFIED ESTROGEN RECEPTOR STATUS (HCC): ICD-10-CM

## 2019-12-05 PROCEDURE — 71552 MRI CHEST W/O & W/DYE: CPT

## 2019-12-05 PROCEDURE — A9585 GADOBUTROL INJECTION: HCPCS | Performed by: INTERNAL MEDICINE

## 2019-12-05 RX ADMIN — GADOBUTROL 8 ML: 604.72 INJECTION INTRAVENOUS at 14:00

## 2019-12-20 ENCOUNTER — HOSPITAL ENCOUNTER (OUTPATIENT)
Dept: RADIOLOGY | Facility: HOSPITAL | Age: 49
Discharge: HOME | End: 2019-12-20
Attending: INTERNAL MEDICINE | Admitting: UROLOGY
Payer: COMMERCIAL

## 2019-12-20 VITALS
SYSTOLIC BLOOD PRESSURE: 113 MMHG | DIASTOLIC BLOOD PRESSURE: 67 MMHG | BODY MASS INDEX: 28.12 KG/M2 | HEIGHT: 66 IN | HEART RATE: 77 BPM | RESPIRATION RATE: 18 BRPM | WEIGHT: 175 LBS | TEMPERATURE: 97.9 F | OXYGEN SATURATION: 96 %

## 2019-12-20 DIAGNOSIS — C50.511 MALIGNANT NEOPLASM OF LOWER-OUTER QUADRANT OF RIGHT FEMALE BREAST (HCC): ICD-10-CM

## 2019-12-20 LAB
ERYTHROCYTE [DISTWIDTH] IN BLOOD BY AUTOMATED COUNT: 13.1 % (ref 11.6–15.1)
HCG SERPL QL: NEGATIVE
HCT VFR BLD AUTO: 40.6 % (ref 34.8–46.1)
HGB BLD-MCNC: 13.3 G/DL (ref 11.5–15.4)
INR PPP: 1.01 (ref 0.84–1.19)
MCH RBC QN AUTO: 29.8 PG (ref 26.8–34.3)
MCHC RBC AUTO-ENTMCNC: 32.8 G/DL (ref 31.4–37.4)
MCV RBC AUTO: 91 FL (ref 82–98)
PLATELET # BLD AUTO: 255 THOUSANDS/UL (ref 149–390)
PMV BLD AUTO: 9.6 FL (ref 8.9–12.7)
PROTHROMBIN TIME: 12.9 SECONDS (ref 11.6–14.5)
RBC # BLD AUTO: 4.47 MILLION/UL (ref 3.81–5.12)
WBC # BLD AUTO: 6.24 THOUSAND/UL (ref 4.31–10.16)

## 2019-12-20 PROCEDURE — 99024 POSTOP FOLLOW-UP VISIT: CPT | Performed by: RADIOLOGY

## 2019-12-20 PROCEDURE — 88305 TISSUE EXAM BY PATHOLOGIST: CPT | Performed by: PATHOLOGY

## 2019-12-20 PROCEDURE — 99152 MOD SED SAME PHYS/QHP 5/>YRS: CPT | Performed by: RADIOLOGY

## 2019-12-20 PROCEDURE — 88342 IMHCHEM/IMCYTCHM 1ST ANTB: CPT | Performed by: PATHOLOGY

## 2019-12-20 PROCEDURE — 88363 XM ARCHIVE TISSUE MOLEC ANAL: CPT | Performed by: PATHOLOGY

## 2019-12-20 PROCEDURE — 77012 CT SCAN FOR NEEDLE BIOPSY: CPT | Performed by: RADIOLOGY

## 2019-12-20 PROCEDURE — 88341 IMHCHEM/IMCYTCHM EA ADD ANTB: CPT | Performed by: PATHOLOGY

## 2019-12-20 PROCEDURE — 20220 BONE BIOPSY TROCAR/NDL SUPFC: CPT

## 2019-12-20 PROCEDURE — 99152 MOD SED SAME PHYS/QHP 5/>YRS: CPT

## 2019-12-20 PROCEDURE — 20220 BONE BIOPSY TROCAR/NDL SUPFC: CPT | Performed by: RADIOLOGY

## 2019-12-20 PROCEDURE — 77012 CT SCAN FOR NEEDLE BIOPSY: CPT

## 2019-12-20 PROCEDURE — 99153 MOD SED SAME PHYS/QHP EA: CPT

## 2019-12-20 PROCEDURE — 85610 PROTHROMBIN TIME: CPT | Performed by: STUDENT IN AN ORGANIZED HEALTH CARE EDUCATION/TRAINING PROGRAM

## 2019-12-20 PROCEDURE — 85027 COMPLETE CBC AUTOMATED: CPT | Performed by: STUDENT IN AN ORGANIZED HEALTH CARE EDUCATION/TRAINING PROGRAM

## 2019-12-20 PROCEDURE — 84703 CHORIONIC GONADOTROPIN ASSAY: CPT | Performed by: INTERNAL MEDICINE

## 2019-12-20 RX ORDER — MIDAZOLAM HYDROCHLORIDE 2 MG/2ML
INJECTION, SOLUTION INTRAMUSCULAR; INTRAVENOUS CODE/TRAUMA/SEDATION MEDICATION
Status: COMPLETED | OUTPATIENT
Start: 2019-12-20 | End: 2019-12-20

## 2019-12-20 RX ORDER — FENTANYL CITRATE 50 UG/ML
INJECTION, SOLUTION INTRAMUSCULAR; INTRAVENOUS CODE/TRAUMA/SEDATION MEDICATION
Status: COMPLETED | OUTPATIENT
Start: 2019-12-20 | End: 2019-12-20

## 2019-12-20 RX ORDER — NAPROXEN 500 MG/1
500 TABLET ORAL 2 TIMES DAILY PRN
COMMUNITY

## 2019-12-20 RX ORDER — SODIUM CHLORIDE 9 MG/ML
75 INJECTION, SOLUTION INTRAVENOUS CONTINUOUS
Status: DISCONTINUED | OUTPATIENT
Start: 2019-12-20 | End: 2019-12-20 | Stop reason: HOSPADM

## 2019-12-20 RX ADMIN — MIDAZOLAM 1 MG: 1 INJECTION INTRAMUSCULAR; INTRAVENOUS at 09:45

## 2019-12-20 RX ADMIN — SODIUM CHLORIDE 75 ML/HR: 0.9 INJECTION, SOLUTION INTRAVENOUS at 08:53

## 2019-12-20 RX ADMIN — MIDAZOLAM 1 MG: 1 INJECTION INTRAMUSCULAR; INTRAVENOUS at 09:57

## 2019-12-20 RX ADMIN — FENTANYL CITRATE 50 MCG: 50 INJECTION, SOLUTION INTRAMUSCULAR; INTRAVENOUS at 09:52

## 2019-12-20 RX ADMIN — MIDAZOLAM 1 MG: 1 INJECTION INTRAMUSCULAR; INTRAVENOUS at 09:52

## 2019-12-20 RX ADMIN — FENTANYL CITRATE 50 MCG: 50 INJECTION, SOLUTION INTRAMUSCULAR; INTRAVENOUS at 09:57

## 2019-12-20 RX ADMIN — FENTANYL CITRATE 50 MCG: 50 INJECTION, SOLUTION INTRAMUSCULAR; INTRAVENOUS at 09:45
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
## 2019-12-20 NOTE — DISCHARGE INSTRUCTIONS
POST BIOPSY    Care after your procedure:    1  Limit your activities for 24 hours after your biopsy  2  No driving day of biopsy  3  Return to your normal diet  Small sips of flat soda will help with mild nausea  4  Remove band-aid or dressing 24 hours after procedure  Contact Interventional Radiology at [phone] [place] PATIENTS: Contact Interventional Radiology at [phone]) Antonio Hoffman PATIENTS: Contact Interventional Radiology at [phone]) if:    1  Difficulty breathing, nausea or vomiting  2  Chills or fever above 101 degrees F      3  Pain at biopsy site not relieved by medication  4  Develop any redness, swelling, heat, unusual drainage, heavy bruising or bleeding from biopsy site

## 2019-12-20 NOTE — BRIEF OP NOTE (RAD/CATH)
IR IMAGE GUIDED BIOPSY/ASPIRATION Procedure Note    PATIENT NAME: Pankaj Gilmore  : 1970  MRN: [id number]    Pre-op Diagnosis:   1  Malignant neoplasm of lower-outer quadrant of right female breast ([facility] [address] )      Post-op Diagnosis:   1  Malignant neoplasm of lower-outer quadrant of right female breast [facility])        Surgeon:   Yemi Zavaleta MD    Estimated Blood Loss: None    Findings: Successful sternal lesion biopsy  Specimens: 2 core needle samples placed into formalin      Complications:  None    Anesthesia: Conscious sedation and Local    Yemi Zavaleta MD     Date: 2019  Time: 10:16 AM

## 2019-12-27 ENCOUNTER — TRANSCRIBE ORDERS (OUTPATIENT)
Dept: ADMINISTRATIVE | Facility: HOSPITAL | Age: 49
End: 2019-12-27

## 2019-12-27 DIAGNOSIS — C50.511 MALIGNANT NEOPLASM OF LOWER-OUTER QUADRANT OF RIGHT FEMALE BREAST, UNSPECIFIED ESTROGEN RECEPTOR STATUS (HCC): Primary | ICD-10-CM

## 2019-12-27 DIAGNOSIS — G89.29 CHRONIC BILATERAL LOW BACK PAIN WITHOUT SCIATICA: ICD-10-CM

## 2019-12-27 DIAGNOSIS — M54.50 CHRONIC BILATERAL LOW BACK PAIN WITHOUT SCIATICA: ICD-10-CM

## 2019-12-27 DIAGNOSIS — R51.9 NONINTRACTABLE HEADACHE, UNSPECIFIED CHRONICITY PATTERN, UNSPECIFIED HEADACHE TYPE: Primary | ICD-10-CM

## 2019-12-27 DIAGNOSIS — C50.511 MALIGNANT NEOPLASM OF LOWER-OUTER QUADRANT OF RIGHT FEMALE BREAST, UNSPECIFIED ESTROGEN RECEPTOR STATUS (HCC): ICD-10-CM

## 2020-01-09 ENCOUNTER — HOSPITAL ENCOUNTER (OUTPATIENT)
Dept: RADIOLOGY | Age: 50
End: 2020-01-09
Payer: COMMERCIAL

## 2020-01-09 ENCOUNTER — APPOINTMENT (OUTPATIENT)
Dept: RADIOLOGY | Facility: HOSPITAL | Age: 50
End: 2020-01-09
Attending: INTERNAL MEDICINE
Payer: COMMERCIAL

## 2020-01-09 ENCOUNTER — HOSPITAL ENCOUNTER (OUTPATIENT)
Dept: RADIOLOGY | Age: 50
Discharge: HOME | End: 2020-01-09
Attending: INTERNAL MEDICINE
Payer: COMMERCIAL

## 2020-01-09 ENCOUNTER — HOSPITAL ENCOUNTER (OUTPATIENT)
Dept: RADIOLOGY | Facility: HOSPITAL | Age: 50
Discharge: HOME | End: 2020-01-09
Payer: COMMERCIAL

## 2020-01-09 ENCOUNTER — HOSPITAL ENCOUNTER (OUTPATIENT)
Dept: RADIOLOGY | Age: 50
Discharge: HOME | End: 2020-01-09
Payer: COMMERCIAL

## 2020-01-09 DIAGNOSIS — C50.911 MALIGNANT NEOPLASM OF RIGHT FEMALE BREAST (HCC): ICD-10-CM

## 2020-01-09 DIAGNOSIS — R51.9 NONINTRACTABLE HEADACHE, UNSPECIFIED CHRONICITY PATTERN, UNSPECIFIED HEADACHE TYPE: ICD-10-CM

## 2020-01-09 DIAGNOSIS — C50.511 MALIGNANT NEOPLASM OF LOWER-OUTER QUADRANT OF RIGHT FEMALE BREAST, UNSPECIFIED ESTROGEN RECEPTOR STATUS (HCC): ICD-10-CM

## 2020-01-09 LAB — GLUCOSE SERPL-MCNC: 78 MG/DL (ref 65–140)

## 2020-01-09 PROCEDURE — A9585 GADOBUTROL INJECTION: HCPCS | Performed by: INTERNAL MEDICINE

## 2020-01-09 PROCEDURE — 82948 REAGENT STRIP/BLOOD GLUCOSE: CPT

## 2020-01-09 PROCEDURE — 70553 MRI BRAIN STEM W/O & W/DYE: CPT

## 2020-01-09 PROCEDURE — 74177 CT ABD & PELVIS W/CONTRAST: CPT

## 2020-01-09 PROCEDURE — A9552 F18 FDG: HCPCS

## 2020-01-09 PROCEDURE — 78815 PET IMAGE W/CT SKULL-THIGH: CPT

## 2020-01-09 PROCEDURE — 71260 CT THORAX DX C+: CPT

## 2020-01-09 RX ADMIN — GADOBUTROL 8 ML: 604.72 INJECTION INTRAVENOUS at 14:35

## 2020-01-09 RX ADMIN — IOHEXOL 100 ML: 350 INJECTION, SOLUTION INTRAVENOUS at 16:59

## 2020-01-12 ENCOUNTER — HOSPITAL ENCOUNTER (OUTPATIENT)
Dept: RADIOLOGY | Facility: HOSPITAL | Age: 50
Discharge: HOME | End: 2020-01-12
Attending: INTERNAL MEDICINE
Payer: COMMERCIAL

## 2020-01-12 DIAGNOSIS — G89.29 CHRONIC BILATERAL LOW BACK PAIN WITHOUT SCIATICA: ICD-10-CM

## 2020-01-12 DIAGNOSIS — C50.511 MALIGNANT NEOPLASM OF LOWER-OUTER QUADRANT OF RIGHT FEMALE BREAST, UNSPECIFIED ESTROGEN RECEPTOR STATUS (HCC): ICD-10-CM

## 2020-01-12 DIAGNOSIS — M54.50 CHRONIC BILATERAL LOW BACK PAIN WITHOUT SCIATICA: ICD-10-CM

## 2020-01-12 PROCEDURE — 72148 MRI LUMBAR SPINE W/O DYE: CPT

## 2020-01-12 PROCEDURE — 72146 MRI CHEST SPINE W/O DYE: CPT

## 2020-01-16 ENCOUNTER — TELEPHONE (OUTPATIENT)
Dept: RADIOLOGY | Facility: HOSPITAL | Age: 50
End: 2020-01-16

## 2020-01-16 ENCOUNTER — TRANSCRIBE ORDERS (OUTPATIENT)
Dept: ADMINISTRATIVE | Facility: HOSPITAL | Age: 50
End: 2020-01-16

## 2020-01-16 DIAGNOSIS — C50.911 MALIGNANT NEOPLASM OF RIGHT FEMALE BREAST, UNSPECIFIED ESTROGEN RECEPTOR STATUS, UNSPECIFIED SITE OF BREAST (HCC): Primary | ICD-10-CM

## 2020-01-16 RX ORDER — SODIUM CHLORIDE 9 MG/ML
75 INJECTION, SOLUTION INTRAVENOUS CONTINUOUS
Status: CANCELLED | OUTPATIENT
Start: 2020-01-16

## 2020-01-16 RX ORDER — CEFAZOLIN SODIUM 1 G/50ML
1000 SOLUTION INTRAVENOUS EVERY 8 HOURS
Status: CANCELLED | OUTPATIENT
Start: 2020-01-16

## 2020-01-17 LAB — SCAN RESULT: NORMAL

## 2020-01-19 ENCOUNTER — TELEPHONE (OUTPATIENT)
Dept: INPATIENT UNIT | Facility: HOSPITAL | Age: 50
End: 2020-01-19

## 2020-01-20 ENCOUNTER — HOSPITAL ENCOUNTER (OUTPATIENT)
Dept: ULTRASOUND IMAGING | Facility: CLINIC | Age: 50
Discharge: HOME | End: 2020-01-20
Payer: COMMERCIAL

## 2020-01-20 ENCOUNTER — HOSPITAL ENCOUNTER (OUTPATIENT)
Dept: RADIOLOGY | Facility: HOSPITAL | Age: 50
Discharge: HOME | End: 2020-01-20
Attending: INTERNAL MEDICINE | Admitting: RADIOLOGY
Payer: COMMERCIAL

## 2020-01-20 ENCOUNTER — HOSPITAL ENCOUNTER (OUTPATIENT)
Dept: MAMMOGRAPHY | Facility: CLINIC | Age: 50
Discharge: HOME | End: 2020-01-20
Payer: COMMERCIAL

## 2020-01-20 ENCOUNTER — APPOINTMENT (OUTPATIENT)
Dept: NON INVASIVE DIAGNOSTICS | Facility: HOSPITAL | Age: 50
End: 2020-01-20
Payer: COMMERCIAL

## 2020-01-20 ENCOUNTER — APPOINTMENT (OUTPATIENT)
Dept: NON INVASIVE DIAGNOSTICS | Facility: HOSPITAL | Age: 50
End: 2020-01-20
Attending: INTERNAL MEDICINE
Payer: COMMERCIAL

## 2020-01-20 ENCOUNTER — APPOINTMENT (OUTPATIENT)
Dept: NON INVASIVE DIAGNOSTICS | Facility: CLINIC | Age: 50
End: 2020-01-20
Attending: INTERNAL MEDICINE
Payer: COMMERCIAL

## 2020-01-20 VITALS — BODY MASS INDEX: 28.12 KG/M2 | HEIGHT: 66 IN | WEIGHT: 175 LBS

## 2020-01-20 VITALS
SYSTOLIC BLOOD PRESSURE: 124 MMHG | OXYGEN SATURATION: 99 % | HEART RATE: 101 BPM | TEMPERATURE: 98.2 F | DIASTOLIC BLOOD PRESSURE: 57 MMHG

## 2020-01-20 DIAGNOSIS — C50.511 MALIGNANT NEOPLASM OF LOWER-OUTER QUADRANT OF RIGHT FEMALE BREAST (HCC): ICD-10-CM

## 2020-01-20 DIAGNOSIS — C50.511 MALIGNANT NEOPLASM OF LOWER-OUTER QUADRANT OF RIGHT FEMALE BREAST, UNSPECIFIED ESTROGEN RECEPTOR STATUS (HCC): ICD-10-CM

## 2020-01-20 DIAGNOSIS — C50.911 MALIGNANT NEOPLASM OF RIGHT FEMALE BREAST, UNSPECIFIED ESTROGEN RECEPTOR STATUS, UNSPECIFIED SITE OF BREAST (HCC): ICD-10-CM

## 2020-01-20 DIAGNOSIS — C50.911 MALIGNANT NEOPLASM OF RIGHT FEMALE BREAST (HCC): ICD-10-CM

## 2020-01-20 LAB — WBC # BLD AUTO: 6.65 THOUSAND/UL (ref 4.31–10.16)

## 2020-01-20 PROCEDURE — 76642 ULTRASOUND BREAST LIMITED: CPT

## 2020-01-20 PROCEDURE — 77001 FLUOROGUIDE FOR VEIN DEVICE: CPT

## 2020-01-20 PROCEDURE — G0279 TOMOSYNTHESIS, MAMMO: HCPCS

## 2020-01-20 PROCEDURE — 99152 MOD SED SAME PHYS/QHP 5/>YRS: CPT

## 2020-01-20 PROCEDURE — 77066 DX MAMMO INCL CAD BI: CPT

## 2020-01-20 PROCEDURE — 99152 MOD SED SAME PHYS/QHP 5/>YRS: CPT | Performed by: RADIOLOGY

## 2020-01-20 PROCEDURE — 93306 TTE W/DOPPLER COMPLETE: CPT

## 2020-01-20 PROCEDURE — 76937 US GUIDE VASCULAR ACCESS: CPT | Performed by: RADIOLOGY

## 2020-01-20 PROCEDURE — 36561 INSERT TUNNELED CV CATH: CPT | Performed by: RADIOLOGY

## 2020-01-20 PROCEDURE — 93306 TTE W/DOPPLER COMPLETE: CPT | Performed by: INTERNAL MEDICINE

## 2020-01-20 PROCEDURE — 85048 AUTOMATED LEUKOCYTE COUNT: CPT | Performed by: PHYSICIAN ASSISTANT

## 2020-01-20 PROCEDURE — 99153 MOD SED SAME PHYS/QHP EA: CPT

## 2020-01-20 PROCEDURE — 77001 FLUOROGUIDE FOR VEIN DEVICE: CPT | Performed by: RADIOLOGY

## 2020-01-20 PROCEDURE — 76937 US GUIDE VASCULAR ACCESS: CPT

## 2020-01-20 PROCEDURE — C1788 PORT, INDWELLING, IMP: HCPCS

## 2020-01-20 PROCEDURE — 36561 INSERT TUNNELED CV CATH: CPT

## 2020-01-20 RX ORDER — GABAPENTIN 100 MG/1
100 CAPSULE ORAL 3 TIMES DAILY PRN
COMMUNITY

## 2020-01-20 RX ORDER — LIDOCAINE HYDROCHLORIDE AND EPINEPHRINE 10; 10 MG/ML; UG/ML
INJECTION, SOLUTION INFILTRATION; PERINEURAL CODE/TRAUMA/SEDATION MEDICATION
Status: COMPLETED | OUTPATIENT
Start: 2020-01-20 | End: 2020-01-20

## 2020-01-20 RX ORDER — MIDAZOLAM HYDROCHLORIDE 2 MG/2ML
INJECTION, SOLUTION INTRAMUSCULAR; INTRAVENOUS CODE/TRAUMA/SEDATION MEDICATION
Status: COMPLETED | OUTPATIENT
Start: 2020-01-20 | End: 2020-01-20

## 2020-01-20 RX ORDER — DIPHENHYDRAMINE HYDROCHLORIDE 50 MG/ML
INJECTION INTRAMUSCULAR; INTRAVENOUS CODE/TRAUMA/SEDATION MEDICATION
Status: COMPLETED | OUTPATIENT
Start: 2020-01-20 | End: 2020-01-20

## 2020-01-20 RX ORDER — VANCOMYCIN HYDROCHLORIDE 1 G/200ML
12.5 INJECTION, SOLUTION INTRAVENOUS ONCE
Status: COMPLETED | OUTPATIENT
Start: 2020-01-20 | End: 2020-01-20

## 2020-01-20 RX ORDER — SODIUM CHLORIDE 9 MG/ML
75 INJECTION, SOLUTION INTRAVENOUS CONTINUOUS
Status: DISCONTINUED | OUTPATIENT
Start: 2020-01-20 | End: 2020-01-20 | Stop reason: HOSPADM

## 2020-01-20 RX ORDER — CEFAZOLIN SODIUM 1 G/50ML
1000 SOLUTION INTRAVENOUS ONCE
Status: COMPLETED | OUTPATIENT
Start: 2020-01-20 | End: 2020-01-20

## 2020-01-20 RX ORDER — FENTANYL CITRATE 50 UG/ML
INJECTION, SOLUTION INTRAMUSCULAR; INTRAVENOUS CODE/TRAUMA/SEDATION MEDICATION
Status: COMPLETED | OUTPATIENT
Start: 2020-01-20 | End: 2020-01-20

## 2020-01-20 RX ADMIN — LIDOCAINE HYDROCHLORIDE AND EPINEPHRINE 3 ML: 10; 10 INJECTION, SOLUTION INFILTRATION; PERINEURAL at 14:18

## 2020-01-20 RX ADMIN — MIDAZOLAM 1 MG: 1 INJECTION INTRAMUSCULAR; INTRAVENOUS at 14:17

## 2020-01-20 RX ADMIN — MIDAZOLAM 1 MG: 1 INJECTION INTRAMUSCULAR; INTRAVENOUS at 14:05

## 2020-01-20 RX ADMIN — CEFAZOLIN SODIUM 1000 MG: 1 SOLUTION INTRAVENOUS at 14:00

## 2020-01-20 RX ADMIN — FENTANYL CITRATE 50 MCG: 50 INJECTION, SOLUTION INTRAMUSCULAR; INTRAVENOUS at 14:17

## 2020-01-20 RX ADMIN — FENTANYL CITRATE 25 MCG: 50 INJECTION, SOLUTION INTRAMUSCULAR; INTRAVENOUS at 14:31

## 2020-01-20 RX ADMIN — FENTANYL CITRATE 50 MCG: 50 INJECTION, SOLUTION INTRAMUSCULAR; INTRAVENOUS at 14:04

## 2020-01-20 RX ADMIN — FENTANYL CITRATE 25 MCG: 50 INJECTION, SOLUTION INTRAMUSCULAR; INTRAVENOUS at 14:24

## 2020-01-20 RX ADMIN — DIPHENHYDRAMINE HYDROCHLORIDE 50 MG: 50 INJECTION, SOLUTION INTRAMUSCULAR; INTRAVENOUS at 13:51

## 2020-01-20 RX ADMIN — VANCOMYCIN HYDROCHLORIDE 1000 MG: 1 INJECTION, SOLUTION INTRAVENOUS at 14:15

## 2020-01-20 RX ADMIN — MIDAZOLAM 1 MG: 1 INJECTION INTRAMUSCULAR; INTRAVENOUS at 14:24

## 2020-01-20 RX ADMIN — MIDAZOLAM 1 MG: 1 INJECTION INTRAMUSCULAR; INTRAVENOUS at 14:31

## 2020-01-20 NOTE — BRIEF OP NOTE (RAD/CATH)
INTERVENTIONAL RADIOLOGY PROCEDURE NOTE    Date: 1/20/2020    Procedure: IR PORT PLACEMENT     Preoperative diagnosis:   1  Malignant neoplasm of lower-outer quadrant of right female breast ([facility] [address] )    2  Malignant neoplasm of lower-outer quadrant of right female breast, unspecified estrogen receptor status ([facility] [address] )         Postoperative diagnosis: Same  Surgeon: Yvrose Contreras MD     Assistant: None  No qualified resident was available  Blood loss: minimal    Specimens: none    Findings: Successful left sided port placement  Complications: None immediate      Anesthesia: concious sedation

## 2020-01-20 NOTE — DISCHARGE INSTRUCTIONS
Implanted Venous Access Port     WHAT YOU NEED TO KNOW:   An implanted venous access port is a device used to give treatments and take blood  It may also be called a central venous access device (CVAD)  The port is a small container that is placed under your skin, usually in your upper chest  The port is attached to a catheter that enters a large vein  DISCHARGE INSTRUCTIONS:   Resume your normal diet  Small sips of flat soda will help with mild nausea  Prevent an infection:   · Wash your hands often  Use soap and water  Clean your hands before and after you care for your port  Remind everyone who cares for your port to wash their hands  · Check your skin for infection every day  Look for redness, swelling, or fluid oozing from the port site  Care for your port:   1  You may shower beginning 48 hours after procedure  2  Change dressing if it becomes wet  3  Remove dressing after 24 hours  Leave glue in place  4  It is normal for some bruising to occur  5  Use Tylenol for pain  6  Limit use of arm on the side that your port was placed  Lift nothing heavier than 5 pounds for 1 week, and then gradually increase activity as tolerated  7  DO NOT apply ointment, lotion or cream to port site until incision is healed  Allow glue to fall off  DO NOT attempt to peel glue from skin even it it begins to flake  8, After the port incision is healed you may swim, bathe  Notify the Interventional Radiologist if you have any of the followin  Fever above 101 F    2  Increased redness or swelling after 1st day  3  Increased pain after 1st day  4  Any sign of infection (drainage from port site, skin separation, hot to touch)  5  Persistent nausea or vomiting  Contact Interventional Radiology at [phone] [facility] PATIENTS: Contact Interventional Radiology at [phone]) ([address] [address]: Contact Interventional Radiology at [phone])

## 2020-01-20 NOTE — PROGRESS NOTES
Met with patient and Dr Sundeep Villa regarding recommendation for;      __x___ RIGHT ______LEFT      __x___Ultrasound guided  ______Stereotactic  Breast biopsy  _x____Verbalized understanding        Blood thinners:  _____yes __x___no    Date stopped: ____n/a_______    Biopsy teaching sheet given:  ___x____yes ______no

## 2020-01-20 NOTE — SEDATION DOCUMENTATION
Bedrest x 1 hr - report to RN - Dr Teagan Blas evaluated rt chest wound from biopsy - area covered with 2x2 gauze and pt returned to room via stretcher

## 2020-01-23 ENCOUNTER — HOSPITAL ENCOUNTER (OUTPATIENT)
Dept: ULTRASOUND IMAGING | Facility: CLINIC | Age: 50
Discharge: HOME | End: 2020-01-23
Admitting: INTERNAL MEDICINE
Payer: COMMERCIAL

## 2020-01-23 ENCOUNTER — HOSPITAL ENCOUNTER (OUTPATIENT)
Dept: MAMMOGRAPHY | Facility: CLINIC | Age: 50
Discharge: HOME | End: 2020-01-23

## 2020-01-23 VITALS — DIASTOLIC BLOOD PRESSURE: 80 MMHG | SYSTOLIC BLOOD PRESSURE: 133 MMHG | HEART RATE: 103 BPM

## 2020-01-23 DIAGNOSIS — R92.8 ABNORMAL ULTRASOUND OF BREAST: ICD-10-CM

## 2020-01-23 DIAGNOSIS — C50.511 MALIGNANT NEOPLASM OF LOWER-OUTER QUADRANT OF RIGHT FEMALE BREAST, UNSPECIFIED ESTROGEN RECEPTOR STATUS (HCC): ICD-10-CM

## 2020-01-23 PROCEDURE — 88341 IMHCHEM/IMCYTCHM EA ADD ANTB: CPT | Performed by: PATHOLOGY

## 2020-01-23 PROCEDURE — 88342 IMHCHEM/IMCYTCHM 1ST ANTB: CPT | Performed by: PATHOLOGY

## 2020-01-23 PROCEDURE — 19083 BX BREAST 1ST LESION US IMAG: CPT

## 2020-01-23 PROCEDURE — 88361 TUMOR IMMUNOHISTOCHEM/COMPUT: CPT | Performed by: PATHOLOGY

## 2020-01-23 PROCEDURE — 88305 TISSUE EXAM BY PATHOLOGIST: CPT | Performed by: PATHOLOGY

## 2020-01-23 RX ORDER — LIDOCAINE HYDROCHLORIDE 10 MG/ML
5 INJECTION, SOLUTION EPIDURAL; INFILTRATION; INTRACAUDAL; PERINEURAL ONCE
Status: COMPLETED | OUTPATIENT
Start: 2020-01-23 | End: 2020-01-23

## 2020-01-23 RX ADMIN — LIDOCAINE HYDROCHLORIDE 5 ML: 10 INJECTION, SOLUTION EPIDURAL; INFILTRATION; INTRACAUDAL; PERINEURAL at 14:20

## 2020-01-23 NOTE — PROGRESS NOTES
Procedure type:    __x___ultrasound guided _____stereotactic    Breast:    _____Left ___x__Right    Location: 6 o'clock    Needle: 12 gauge jeannine    # of passes: 2    Clip: Wing    Performed by: Dr Vicky Johnson held for 5 minutes by: Lisa Vieira Strips:    ___x__yes _____no    Band aid:    ___x__yes_____no    Tape and guaze:    _____yes ___x__no    Tolerated procedure:    ___x__yes _____no

## 2020-01-23 NOTE — DISCHARGE INSTR - OTHER ORDERS
POST LARGE CORE BREAST BIOPSY PATIENT INFORMATION      1  Place an ice pack inside your bra over the top of the dressing every hour for 20 minutes (20 minutes on, 60 minutes off)  Do this until bedtime  2  Do not shower or bathe until the following morning  3  You may bathe your breast carefully with the steri-strips in place  Be careful    Not to loosen them  The steri-strips will fall off in 3-5 days  4  You may have mild discomfort, and you may have some bruising where the   Needle entered the skin  This should clear within 5-7 days  5  If you need medicine for discomfort, take acetaminophen products such as   Tylenol  You may also take Advil or Motrin products  6  Do not participate in strenuous activities such as-tennis, aerobics, skiing,  Weight lifting, etc  for 24 hours  Refrain from swimming/soaking for 72 hours  7  Wearing a bra for sleeping may be more comfortable for the first 24-48 hours  8  Watch for continued bleeding, pain or fever over 101; please call with any questions or concerns  For procedures done at the [facility]  [place] [facility] [address] call:  Dhruv Martinez RN at [phone]  Marcie Mauro RN at [phone]                    *After 4 PM call the Interventional Radiology Department                    [phone] and ask to speak with the nurse on call  For procedures done at the [address] [place] [address] call:         Jeanne Whatley RN at   *After 4 PM call the Interventional Radiology Department   [phone] and ask to speak with the nurse on call  For procedures done at [address] [place] [address] call: The Radiology Nurse at [phone]  *After 4 PM call your physician, or go to the Emergency Department  9          The final results of your biopsy are usually available within one week

## 2020-01-24 ENCOUNTER — TRANSCRIBE ORDERS (OUTPATIENT)
Dept: ADMINISTRATIVE | Facility: HOSPITAL | Age: 50
End: 2020-01-24

## 2020-01-24 DIAGNOSIS — C50.511 MALIGNANT NEOPLASM OF LOWER-OUTER QUADRANT OF RIGHT FEMALE BREAST, UNSPECIFIED ESTROGEN RECEPTOR STATUS (HCC): Primary | ICD-10-CM

## 2020-01-24 NOTE — PROGRESS NOTES
Post procedure call completed    Bleeding: _____yes __X___no    Pain: _____yes ___X___no (Pt denies, used ice and took Tylenol over night)    Redness/Swelling: ______yes ___X___no (Pt denies)    Band aid removed: __X___yes _____no    Steri-Strips intact: ___X___yes _____no    Pt with no questions at this time, adv her MD will call when results available, adv to call with any questions or concerns, has name/# for contact

## 2020-01-28 ENCOUNTER — TELEPHONE (OUTPATIENT)
Dept: MAMMOGRAPHY | Facility: CLINIC | Age: 50
End: 2020-01-28

## 2020-01-28 NOTE — TELEPHONE ENCOUNTER
Epic email sent to Dr Candelario Mariee,    Hi Dr Bryce Bales,     Just checking in to make sure you saw Evangelina's breast biopsy results  Please let me know if I can be of any further assistance       Thanks,   Raymon Woodson, MSN, RN  Breast Nurse Navigator

## 2020-02-19 LAB — SCAN RESULT: NORMAL

## 2020-05-26 ENCOUNTER — TRANSCRIBE ORDERS (OUTPATIENT)
Dept: ADMINISTRATIVE | Facility: HOSPITAL | Age: 50
End: 2020-05-26

## 2020-05-26 DIAGNOSIS — C50.011 MALIGNANT NEOPLASM INVOLVING BOTH NIPPLE AND AREOLA OF RIGHT BREAST IN FEMALE, ESTROGEN RECEPTOR NEGATIVE (HCC): Primary | ICD-10-CM

## 2020-05-26 DIAGNOSIS — Z17.1 MALIGNANT NEOPLASM INVOLVING BOTH NIPPLE AND AREOLA OF RIGHT BREAST IN FEMALE, ESTROGEN RECEPTOR NEGATIVE (HCC): Primary | ICD-10-CM

## 2020-06-01 ENCOUNTER — HOSPITAL ENCOUNTER (OUTPATIENT)
Dept: RADIOLOGY | Facility: HOSPITAL | Age: 50
Discharge: HOME | End: 2020-06-01
Payer: COMMERCIAL

## 2020-06-01 DIAGNOSIS — C50.011 MALIGNANT NEOPLASM INVOLVING BOTH NIPPLE AND AREOLA OF RIGHT BREAST IN FEMALE, ESTROGEN RECEPTOR NEGATIVE (HCC): ICD-10-CM

## 2020-06-01 DIAGNOSIS — Z17.1 MALIGNANT NEOPLASM INVOLVING BOTH NIPPLE AND AREOLA OF RIGHT BREAST IN FEMALE, ESTROGEN RECEPTOR NEGATIVE (HCC): ICD-10-CM

## 2020-06-01 PROCEDURE — 74177 CT ABD & PELVIS W/CONTRAST: CPT

## 2020-06-01 PROCEDURE — 71260 CT THORAX DX C+: CPT

## 2020-06-01 RX ADMIN — IOHEXOL 100 ML: 350 INJECTION, SOLUTION INTRAVENOUS at 19:22

## (undated) DEVICE — GLOVE INDICATOR PI UNDERGLOVE SZ 7 BLUE

## (undated) DEVICE — MARGIN MARKER SET

## (undated) DEVICE — STRL UNIVERSAL MINOR GENERAL: Brand: CARDINAL HEALTH

## (undated) DEVICE — SYRINGE 5ML LL

## (undated) DEVICE — SUT MONOCRYL 5-0 PC-2 18 IN Y495G

## (undated) DEVICE — GLOVE SRG BIOGEL 7

## (undated) DEVICE — 3M™ STERI-STRIP™ REINFORCED ADHESIVE SKIN CLOSURES, R1547, 1/2 IN X 4 IN (12 MM X 100 MM), 6 STRIPS/ENVELOPE: Brand: 3M™ STERI-STRIP™

## (undated) DEVICE — SUT VICRYL 3-0 REEL 54 IN J285G

## (undated) DEVICE — GLOVE INDICATOR PI UNDERGLOVE SZ 6.5 BLUE

## (undated) DEVICE — SUT VICRYL 2-0 SH 27 IN UNDYED J417H

## (undated) DEVICE — NEEDLE 25G X 1 1/2

## (undated) DEVICE — LIGACLIP MCA MULTIPLE CLIP APPLIERS, 20 MEDIUM CLIPS: Brand: LIGACLIP

## (undated) DEVICE — GLOVE SRG BIOGEL 6.5

## (undated) DEVICE — CHLORAPREP HI-LITE 26ML ORANGE

## (undated) DEVICE — 2963 MEDIPORE SOFT CLOTH TAPE 3 IN X 10 YD 12 RLS/CS: Brand: 3M™ MEDIPORE™

## (undated) DEVICE — ADHESIVE SKN CLSR HISTOACRYL FLEX 0.5ML LF

## (undated) DEVICE — GAUZE SPONGES,16 PLY: Brand: CURITY

## (undated) DEVICE — INTENDED FOR TISSUE SEPARATION, AND OTHER PROCEDURES THAT REQUIRE A SHARP SURGICAL BLADE TO PUNCTURE OR CUT.: Brand: BARD-PARKER SAFETY BLADES SIZE 15, STERILE

## (undated) DEVICE — 2000CC GUARDIAN II: Brand: GUARDIAN

## (undated) DEVICE — DRAPE PROBE NEO-PROBE/ULTRASOUND

## (undated) DEVICE — SUT SILK 2-0 SH 30 IN K833H

## (undated) DEVICE — TELFA NON-ADHERENT ABSORBENT DRESSING: Brand: TELFA

## (undated) DEVICE — SCD SEQUENTIAL COMPRESSION COMFORT SLEEVE MEDIUM KNEE LENGTH: Brand: KENDALL SCD

## (undated) DEVICE — PLUMEPEN PRO 10FT

## (undated) DEVICE — SUT VICRYL 3-0 SH 27 IN J416H

## (undated) DEVICE — 3M™ STERI-STRIP™ COMPOUND BENZOIN TINCTURE 40 BAGS/CARTON 4 CARTONS/CASE C1544: Brand: 3M™ STERI-STRIP™

## (undated) DEVICE — INSULATED BLADE ELECTRODE: Brand: EDGE

## (undated) DEVICE — REM POLYHESIVE ADULT PATIENT RETURN ELECTRODE: Brand: VALLEYLAB

## (undated) DEVICE — SUT MONOCRYL 4-0 PS-2 27 IN Y426H

## (undated) DEVICE — JP PERF DRN SIL FLT 10MM FULL: Brand: CARDINAL HEALTH

## (undated) DEVICE — NEEDLE HYPO 22G X 1-1/2 IN

## (undated) DEVICE — SINGLE-USE BIOPSY FORCEPS: Brand: RADIAL JAW 4

## (undated) DEVICE — THYROID SHEET: Brand: CONVERTORS